# Patient Record
Sex: FEMALE | Race: BLACK OR AFRICAN AMERICAN | NOT HISPANIC OR LATINO | Employment: UNEMPLOYED | RURAL
[De-identification: names, ages, dates, MRNs, and addresses within clinical notes are randomized per-mention and may not be internally consistent; named-entity substitution may affect disease eponyms.]

---

## 2017-05-17 ENCOUNTER — HISTORICAL (OUTPATIENT)
Dept: ADMINISTRATIVE | Facility: HOSPITAL | Age: 57
End: 2017-05-17

## 2017-05-18 LAB
LAB AP CLINICAL INFORMATION: NORMAL
LAB AP DIAGNOSIS - HISTORICAL: NORMAL
LAB AP GROSS PATHOLOGY - HISTORICAL: NORMAL
LAB AP SPECIMEN SUBMITTED - HISTORICAL: NORMAL

## 2018-08-06 ENCOUNTER — HISTORICAL (OUTPATIENT)
Dept: ADMINISTRATIVE | Facility: HOSPITAL | Age: 58
End: 2018-08-06

## 2018-08-08 LAB
LAB AP CLINICAL INFORMATION: NORMAL
LAB AP COMMENTS: NORMAL
LAB AP GENERAL CAT - HISTORICAL: NORMAL
LAB AP INTERPRETATION/RESULT - HISTORICAL: NEGATIVE
LAB AP SPECIMEN ADEQUACY - HISTORICAL: NORMAL
LAB AP SPECIMEN SUBMITTED - HISTORICAL: NORMAL

## 2020-07-27 ENCOUNTER — HISTORICAL (OUTPATIENT)
Dept: ADMINISTRATIVE | Facility: HOSPITAL | Age: 60
End: 2020-07-27

## 2020-07-29 LAB
LAB AP CLINICAL INFORMATION: NORMAL
LAB AP GENERAL CAT - HISTORICAL: NORMAL
LAB AP INTERPRETATION/RESULT - HISTORICAL: NEGATIVE
LAB AP SPECIMEN ADEQUACY - HISTORICAL: NORMAL
LAB AP SPECIMEN SUBMITTED - HISTORICAL: NORMAL

## 2020-07-31 LAB
INSULIN SERPL-ACNC: NORMAL U[IU]/ML

## 2020-12-28 ENCOUNTER — HISTORICAL (OUTPATIENT)
Dept: ADMINISTRATIVE | Facility: HOSPITAL | Age: 60
End: 2020-12-28

## 2020-12-28 LAB — SARS-COV+SARS-COV-2 AG RESP QL IA.RAPID: NEGATIVE

## 2021-03-15 ENCOUNTER — HOSPITAL ENCOUNTER (EMERGENCY)
Facility: HOSPITAL | Age: 61
Discharge: HOME | End: 2021-03-15
Attending: EMERGENCY MEDICINE
Payer: COMMERCIAL

## 2021-03-15 VITALS
DIASTOLIC BLOOD PRESSURE: 103 MMHG | OXYGEN SATURATION: 97 % | WEIGHT: 187 LBS | SYSTOLIC BLOOD PRESSURE: 136 MMHG | RESPIRATION RATE: 20 BRPM | HEIGHT: 65 IN | HEART RATE: 64 BPM | TEMPERATURE: 98 F | BODY MASS INDEX: 31.16 KG/M2

## 2021-03-15 DIAGNOSIS — M79.671 RIGHT FOOT PAIN: ICD-10-CM

## 2021-03-15 DIAGNOSIS — S92.511A CLOSED DISPLACED FRACTURE OF PROXIMAL PHALANX OF LESSER TOE OF RIGHT FOOT, INITIAL ENCOUNTER: ICD-10-CM

## 2021-03-15 PROCEDURE — 29550 STRAPPING OF TOES: CPT | Mod: T9

## 2021-03-15 PROCEDURE — 99283 EMERGENCY DEPT VISIT LOW MDM: CPT | Mod: 25

## 2021-03-15 PROCEDURE — 99283 EMERGENCY DEPT VISIT LOW MDM: CPT | Mod: ,,, | Performed by: NURSE PRACTITIONER

## 2021-03-15 PROCEDURE — 99283 PR EMERGENCY DEPT VISIT,LEVEL III: ICD-10-PCS | Mod: ,,, | Performed by: NURSE PRACTITIONER

## 2021-03-15 RX ORDER — LISINOPRIL 10 MG/1
10 TABLET ORAL DAILY
COMMUNITY
End: 2024-02-07

## 2021-03-15 RX ORDER — HYDROCODONE BITARTRATE AND ACETAMINOPHEN 10; 325 MG/1; MG/1
1 TABLET ORAL EVERY 12 HOURS PRN
COMMUNITY
End: 2024-02-07

## 2021-08-04 RX ORDER — OXYBUTYNIN CHLORIDE 5 MG/1
5 TABLET, EXTENDED RELEASE ORAL DAILY
COMMUNITY
End: 2023-11-07

## 2021-08-04 RX ORDER — POTASSIUM CHLORIDE 750 MG/1
10 CAPSULE, EXTENDED RELEASE ORAL ONCE
COMMUNITY
End: 2023-11-07

## 2021-08-04 RX ORDER — HYDROCHLOROTHIAZIDE 12.5 MG/1
12.5 CAPSULE ORAL DAILY
COMMUNITY
End: 2024-02-07

## 2022-12-29 DIAGNOSIS — M54.2 CERVICALGIA: Primary | ICD-10-CM

## 2023-01-10 ENCOUNTER — CLINICAL SUPPORT (OUTPATIENT)
Dept: REHABILITATION | Facility: HOSPITAL | Age: 63
End: 2023-01-10
Payer: MEDICAID

## 2023-01-10 DIAGNOSIS — M54.42 ACUTE BILATERAL LOW BACK PAIN WITH BILATERAL SCIATICA: Primary | ICD-10-CM

## 2023-01-10 DIAGNOSIS — R29.898 WEAKNESS OF BOTH LEGS: ICD-10-CM

## 2023-01-10 DIAGNOSIS — M25.562 ACUTE PAIN OF BOTH KNEES: ICD-10-CM

## 2023-01-10 DIAGNOSIS — M54.2 CERVICALGIA: ICD-10-CM

## 2023-01-10 DIAGNOSIS — M54.41 ACUTE BILATERAL LOW BACK PAIN WITH BILATERAL SCIATICA: Primary | ICD-10-CM

## 2023-01-10 DIAGNOSIS — R29.898 WEAKNESS OF BACK: ICD-10-CM

## 2023-01-10 DIAGNOSIS — M25.561 ACUTE PAIN OF BOTH KNEES: ICD-10-CM

## 2023-01-10 PROBLEM — M54.40 ACUTE BILATERAL LOW BACK PAIN WITH SCIATICA: Status: ACTIVE | Noted: 2023-01-10

## 2023-01-10 PROCEDURE — 97161 PT EVAL LOW COMPLEX 20 MIN: CPT
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
## 2023-01-18 ENCOUNTER — CLINICAL SUPPORT (OUTPATIENT)
Dept: REHABILITATION | Facility: HOSPITAL | Age: 63
End: 2023-01-18
Payer: MEDICAID

## 2023-01-18 DIAGNOSIS — R29.898 WEAKNESS OF BOTH LEGS: ICD-10-CM

## 2023-01-18 DIAGNOSIS — M25.561 ACUTE PAIN OF BOTH KNEES: ICD-10-CM

## 2023-01-18 DIAGNOSIS — M54.41 ACUTE BILATERAL LOW BACK PAIN WITH RIGHT-SIDED SCIATICA: Primary | ICD-10-CM

## 2023-01-18 DIAGNOSIS — M25.562 ACUTE PAIN OF BOTH KNEES: ICD-10-CM

## 2023-01-18 DIAGNOSIS — R29.898 WEAKNESS OF BACK: ICD-10-CM

## 2023-01-18 PROCEDURE — 97112 NEUROMUSCULAR REEDUCATION: CPT

## 2023-01-18 PROCEDURE — 97110 THERAPEUTIC EXERCISES: CPT

## 2023-01-18 PROCEDURE — 97140 MANUAL THERAPY 1/> REGIONS: CPT
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
## 2023-01-24 ENCOUNTER — CLINICAL SUPPORT (OUTPATIENT)
Dept: REHABILITATION | Facility: HOSPITAL | Age: 63
End: 2023-01-24
Payer: MEDICAID

## 2023-01-24 DIAGNOSIS — R29.898 WEAKNESS OF BOTH LEGS: ICD-10-CM

## 2023-01-24 DIAGNOSIS — R29.898 WEAKNESS OF BACK: ICD-10-CM

## 2023-01-24 DIAGNOSIS — M25.562 ACUTE PAIN OF BOTH KNEES: Primary | ICD-10-CM

## 2023-01-24 DIAGNOSIS — M25.561 ACUTE PAIN OF BOTH KNEES: Primary | ICD-10-CM

## 2023-01-24 PROCEDURE — 97112 NEUROMUSCULAR REEDUCATION: CPT | Mod: CQ

## 2023-01-24 PROCEDURE — 97140 MANUAL THERAPY 1/> REGIONS: CPT | Mod: CQ

## 2023-01-24 PROCEDURE — 97110 THERAPEUTIC EXERCISES: CPT | Mod: CQ
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
## 2023-02-03 ENCOUNTER — DOCUMENTATION ONLY (OUTPATIENT)
Dept: REHABILITATION | Facility: HOSPITAL | Age: 63
End: 2023-02-03
Payer: MEDICAID

## 2023-02-03 NOTE — PROGRESS NOTES
Radha arrived to therapy this morning (2/3/2023) and Therapist greeted her in waiting area to inquire how she was feeling, Radha stated she was not feeling well today and was coughing (which she did a few times while we were conversing) and said she felt like she couldn't do anything due to her not feeling well but she felt like she needed to come in anyway's since she had called and cancelled recently. Radha said she went to the Doctor on Monday and got nasal spray and medicine. Therapist informed Patient if she did not feel like she would be able to tolerate modified treatment session today, that we could reschedule since she was not feeling well, but I did tell her I appreciated her letting us know and I hoped she started feeling better over the weekend.  Notified evaluating therapist of this as well today & will follow up with Patient next week and continue with therapy sessions as tolerated when she returns.

## 2023-02-05 NOTE — PROGRESS NOTES
Subjective:       Patient ID: Radha Diallo is a 62 y.o. female.    Chief Complaint:  No chief complaint on file.      History of Present Illness  This pleasant 62 year old right handed  female presents to the clinic as a new patient referral from Dr. Serafin Ruiz for cervical radiculopathy. Patient reports paresthesia symptoms started 3 months ago and has become progressively worse. She rates the burning, numbness, and tingling as a 5 on a scale of 0 to 5 in all 4 extremities that is worse in the left arm. She states the paresthesia symptoms are worse at night with no difference between activity and rest. She denies diabetes, back injury or neck injury. She states the back pain started 2 years ago in the lumbar region and radiates to the legs. She states the back pain is constant with sharp and dull pain that she rates as a 9 on a scale of 0 to 10. She denies back surgery. She reports neck pain started 3 months ago that radiates to the arms and is worse on the left side. She states the neck pain is constant with sharp and aching pain that she rates as a 9 on a scale of 0 to 10. She denies any neck surgery. She is currently followed by the [facility] for the neck and back pain. She is currently doing physical therapy at [facility] for the back and neck pain. She has not had the EMG NCS or MRI's per the patient. She has gabapentin 100 mg three times a day that she has not been taking and zanaflex 4 mg that she has not been taking. I discussed with her as outlined in the plan. Her PMH includes HTN, vitamin d deficiency, and arthritis. Her family medical history includes DM and arthritis. She denies smoking or drinking alcohol. She states she has been through menopause. Discussed the plan in detail with Neurologist Dr. Katie Ruiz and the patient and they are in agreement with the plan. All her questions were answered at today's visit.       Review of Systems  Review of Systems    Constitutional:  Negative for activity change, diaphoresis, fever and unexpected weight change.   HENT:  Negative for congestion, ear pain, facial swelling, hearing loss, tinnitus, trouble swallowing and voice change.    Eyes:  Negative for photophobia, pain and visual disturbance.   Respiratory:  Negative for chest tightness, shortness of breath and wheezing.    Cardiovascular:  Negative for chest pain, palpitations and leg swelling.   Gastrointestinal:  Negative for constipation, diarrhea, nausea and vomiting.   Genitourinary:  Negative for difficulty urinating.   Musculoskeletal:  Positive for back pain and neck pain. Negative for gait problem and neck stiffness.   Skin:  Negative for color change, pallor, rash and wound.   Neurological:  Positive for numbness. Negative for dizziness, tremors, seizures, syncope, facial asymmetry, speech difficulty, weakness, light-headedness and headaches.        Radiculopathy    Psychiatric/Behavioral:  Negative for agitation, behavioral problems, confusion, decreased concentration and hallucinations. The patient is not nervous/anxious and is not hyperactive.      Objective:      Neurologic Exam     Mental Status   Oriented to person, place, and time.   Oriented to person.   Oriented to place.   Oriented to time.   Attention: normal. Concentration: normal.   Speech: speech is normal   Level of consciousness: alert  Knowledge: good.     Cranial Nerves     CN II   Visual fields full to confrontation.     CN III, IV, VI   Pupils are equal, round, and reactive to light.  Extraocular motions are normal.   Right pupil: Size: 3 mm. Shape: regular. Reactivity: brisk.   Left pupil: Size: 3 mm. Shape: regular. Reactivity: brisk.   CN III: no CN III palsy  CN VI: no CN VI palsy    CN V   Facial sensation intact.     CN VII   Facial expression full, symmetric.     CN VIII   CN VIII normal.   Hearing: intact    CN IX, X   CN IX normal.   CN X normal.     CN XI   CN XI normal.     CN XII    CN XII normal.     Motor Exam   Muscle bulk: normal  Overall muscle tone: normal  Right arm pronator drift: absent  Left arm pronator drift: absent    Strength   Right deltoid: 5/5  Left deltoid: 5/5  Right biceps: 5/5  Left biceps: 5/5  Right triceps: 5/5  Left triceps: 5/5  Right quadriceps: 5/5  Left quadriceps: 5/5  Right hamstrin/5  Left hamstrin/5    Sensory Exam   Light touch normal.   Vibration normal.   Proprioception normal.   Pinprick normal.     Gait, Coordination, and Reflexes     Gait  Gait: normal    Coordination   Romberg: negative  Finger to nose coordination: normal  Heel to shin coordination: normal  Tandem walking coordination: normal    Tremor   Resting tremor: absent  Intention tremor: absent  Action tremor: absent    Reflexes   Right brachioradialis: 2+  Left brachioradialis: 2+  Right biceps: 2+  Left biceps: 2+  Right triceps: 2+  Left triceps: 2+  Right patellar: 2+  Left patellar: 2+  Right achilles: 2+  Left achilles: 2+  Right : 4+  Left : 4+    Physical Exam  Constitutional:       General: She is not in acute distress.  HENT:      Head: Normocephalic.      Nose: Nose normal.      Mouth/Throat:      Mouth: Mucous membranes are moist.   Eyes:      Extraocular Movements: Extraocular movements intact and EOM normal.      Pupils: Pupils are equal, round, and reactive to light.   Cardiovascular:      Rate and Rhythm: Normal rate and regular rhythm.      Heart sounds: Normal heart sounds. No murmur heard.  Pulmonary:      Effort: Pulmonary effort is normal. No respiratory distress.      Breath sounds: Normal breath sounds. No wheezing, rhonchi or rales.   Musculoskeletal:         General: No swelling, tenderness, deformity or signs of injury. Normal range of motion.      Cervical back: Normal range of motion. No rigidity or tenderness.      Right lower leg: No edema.      Left lower leg: No edema.   Skin:     General: Skin is warm and dry.      Capillary Refill: Capillary  refill takes less than 2 seconds.      Coloration: Skin is not jaundiced or pale.      Findings: No bruising, erythema, lesion or rash.   Neurological:      Mental Status: She is alert and oriented to person, place, and time.      Coordination: Finger-Nose-Finger Test, Heel to Shin Test and Romberg Test normal.      Gait: Gait is intact. Tandem walk normal.      Deep Tendon Reflexes:      Reflex Scores:       Tricep reflexes are 2+ on the right side and 2+ on the left side.       Bicep reflexes are 2+ on the right side and 2+ on the left side.       Brachioradialis reflexes are 2+ on the right side and 2+ on the left side.       Patellar reflexes are 2+ on the right side and 2+ on the left side.       Achilles reflexes are 2+ on the right side and 2+ on the left side.  Psychiatric:         Mood and Affect: Mood normal.         Speech: Speech normal.         Behavior: Behavior normal.         Assessment:     Problem List Items Addressed This Visit          Neuro    Cervical radiculopathy    Relevant Orders    Ambulatory referral/consult to Neurology    Protein Electrophoresis, Serum with Reflex VINICIO    Sedimentation Rate    DIAZ EIA w/ Reflex to dsDNA/FER    Syphilis Antibody with reflex to RPR    TSH    T4, Free    Hemoglobin A1C    Lumbar radiculopathy    Relevant Orders    Ambulatory referral/consult to Neurology    Protein Electrophoresis, Serum with Reflex VINICIO    Sedimentation Rate    DIAZ EIA w/ Reflex to dsDNA/FER    Syphilis Antibody with reflex to RPR    TSH    T4, Free    Hemoglobin A1C       Palliative Care    On long term drug therapy    Relevant Orders    Protein Electrophoresis, Serum with Reflex VINICIO    Sedimentation Rate    DIAZ EIA w/ Reflex to dsDNA/FER    Syphilis Antibody with reflex to RPR    TSH    T4, Free    Hemoglobin A1C       Other    Paresthesia - Primary    Relevant Orders    Ambulatory referral/consult to Neurology    Protein Electrophoresis, Serum with Reflex VINICIO    Sedimentation Rate    DIAZ  EIA w/ Reflex to dsDNA/FER    Syphilis Antibody with reflex to RPR    TSH    T4, Free    Hemoglobin A1C         Plan:     Referral for EMG NCS all 4 extremities with Dr. TRACEY Dolan   Labs: PPN panel   Continue Gabapentin 100 mg one in the morning and two at night   Continue Zanaflex 4 mg one at bedtime as needed for neck and back pain or muscle spasms   We will get the MRI of the lumbar spine and cervical spine after EMG NCS and physical therapy are completed  Keep follow up with pain treatment at the [facility] for back and neck pain  Continue physical therapy   Further recommendations after work up is completed   Follow up with Neurology in 3 months or sooner if needed

## 2023-02-09 ENCOUNTER — OFFICE VISIT (OUTPATIENT)
Dept: NEUROLOGY | Facility: CLINIC | Age: 63
End: 2023-02-09
Payer: MEDICAID

## 2023-02-09 VITALS
SYSTOLIC BLOOD PRESSURE: 120 MMHG | BODY MASS INDEX: 29.16 KG/M2 | HEART RATE: 71 BPM | WEIGHT: 175 LBS | OXYGEN SATURATION: 96 % | HEIGHT: 65 IN | DIASTOLIC BLOOD PRESSURE: 76 MMHG

## 2023-02-09 DIAGNOSIS — M54.12 CERVICAL RADICULOPATHY: ICD-10-CM

## 2023-02-09 DIAGNOSIS — Z79.899 ON LONG TERM DRUG THERAPY: ICD-10-CM

## 2023-02-09 DIAGNOSIS — R20.2 PARESTHESIA: Primary | ICD-10-CM

## 2023-02-09 DIAGNOSIS — M54.16 LUMBAR RADICULOPATHY: ICD-10-CM

## 2023-02-09 PROBLEM — I10 ESSENTIAL HYPERTENSION: Status: ACTIVE | Noted: 2021-08-31

## 2023-02-09 PROBLEM — E55.9 VITAMIN D DEFICIENCY: Status: ACTIVE | Noted: 2021-09-01

## 2023-02-09 PROBLEM — E78.01 FAMILIAL HYPERCHOLESTEROLEMIA: Status: ACTIVE | Noted: 2021-09-01

## 2023-02-09 PROCEDURE — 99203 OFFICE O/P NEW LOW 30 MIN: CPT | Mod: S$PBB,,, | Performed by: NURSE PRACTITIONER

## 2023-02-09 PROCEDURE — 99215 OFFICE O/P EST HI 40 MIN: CPT | Mod: PBBFAC | Performed by: NURSE PRACTITIONER

## 2023-02-09 PROCEDURE — 3008F BODY MASS INDEX DOCD: CPT | Mod: CPTII,,, | Performed by: NURSE PRACTITIONER

## 2023-02-09 PROCEDURE — 3074F SYST BP LT 130 MM HG: CPT | Mod: CPTII,,, | Performed by: NURSE PRACTITIONER

## 2023-02-09 PROCEDURE — 4010F ACE/ARB THERAPY RXD/TAKEN: CPT | Mod: CPTII,,, | Performed by: NURSE PRACTITIONER

## 2023-02-09 PROCEDURE — 1160F PR REVIEW ALL MEDS BY PRESCRIBER/CLIN PHARMACIST DOCUMENTED: ICD-10-PCS | Mod: CPTII,,, | Performed by: NURSE PRACTITIONER

## 2023-02-09 PROCEDURE — 3078F PR MOST RECENT DIASTOLIC BLOOD PRESSURE < 80 MM HG: ICD-10-PCS | Mod: CPTII,,, | Performed by: NURSE PRACTITIONER

## 2023-02-09 PROCEDURE — 1159F MED LIST DOCD IN RCRD: CPT | Mod: CPTII,,, | Performed by: NURSE PRACTITIONER

## 2023-02-09 PROCEDURE — 3074F PR MOST RECENT SYSTOLIC BLOOD PRESSURE < 130 MM HG: ICD-10-PCS | Mod: CPTII,,, | Performed by: NURSE PRACTITIONER

## 2023-02-09 PROCEDURE — 99203 PR OFFICE/OUTPT VISIT, NEW, LEVL III, 30-44 MIN: ICD-10-PCS | Mod: S$PBB,,, | Performed by: NURSE PRACTITIONER

## 2023-02-09 PROCEDURE — 3078F DIAST BP <80 MM HG: CPT | Mod: CPTII,,, | Performed by: NURSE PRACTITIONER

## 2023-02-09 PROCEDURE — 3008F PR BODY MASS INDEX (BMI) DOCUMENTED: ICD-10-PCS | Mod: CPTII,,, | Performed by: NURSE PRACTITIONER

## 2023-02-09 PROCEDURE — 4010F PR ACE/ARB THEARPY RXD/TAKEN: ICD-10-PCS | Mod: CPTII,,, | Performed by: NURSE PRACTITIONER

## 2023-02-09 PROCEDURE — 1160F RVW MEDS BY RX/DR IN RCRD: CPT | Mod: CPTII,,, | Performed by: NURSE PRACTITIONER

## 2023-02-09 PROCEDURE — 1159F PR MEDICATION LIST DOCUMENTED IN MEDICAL RECORD: ICD-10-PCS | Mod: CPTII,,, | Performed by: NURSE PRACTITIONER

## 2023-02-09 RX ORDER — ROSUVASTATIN CALCIUM 10 MG/1
20 TABLET, COATED ORAL NIGHTLY
COMMUNITY

## 2023-02-09 RX ORDER — METHYLPREDNISOLONE 4 MG/1
TABLET ORAL
COMMUNITY
Start: 2022-11-25 | End: 2023-11-07

## 2023-02-09 RX ORDER — FLUTICASONE PROPIONATE 50 MCG
SPRAY, SUSPENSION (ML) NASAL
COMMUNITY
Start: 2023-01-28 | End: 2023-11-07

## 2023-02-09 RX ORDER — LISINOPRIL AND HYDROCHLOROTHIAZIDE 10; 12.5 MG/1; MG/1
TABLET ORAL
COMMUNITY
End: 2024-03-11 | Stop reason: DRUGHIGH

## 2023-02-09 RX ORDER — LORATADINE 10 MG/1
10 TABLET ORAL DAILY PRN
COMMUNITY
Start: 2023-01-28 | End: 2023-11-07

## 2023-02-09 RX ORDER — TIZANIDINE 4 MG/1
TABLET ORAL
COMMUNITY
End: 2023-11-07

## 2023-02-09 RX ORDER — GABAPENTIN 100 MG/1
CAPSULE ORAL
COMMUNITY
End: 2023-06-14

## 2023-02-09 RX ORDER — FLUTICASONE PROPIONATE 50 MCG
SPRAY, SUSPENSION (ML) NASAL
COMMUNITY
End: 2023-11-07

## 2023-02-09 RX ORDER — CALCIUM CARBONATE/VITAMIN D3 600 MG-20
TABLET,CHEWABLE ORAL
COMMUNITY

## 2023-02-09 RX ORDER — CYCLOBENZAPRINE HCL 10 MG
TABLET ORAL
COMMUNITY
End: 2024-02-07

## 2023-02-09 RX ORDER — PROMETHAZINE HYDROCHLORIDE AND DEXTROMETHORPHAN HYDROBROMIDE 6.25; 15 MG/5ML; MG/5ML
SYRUP ORAL
COMMUNITY
End: 2023-11-07

## 2023-02-09 NOTE — PATIENT INSTRUCTIONS
Referral for EMG NCS all 4 extremities with Dr. TRACEY Dolan   Labs: PPN panel   Continue Gabapentin 100 mg one in the morning and two at night   Continue Zanaflex 4 mg one at bedtime as needed for neck and back pain or muscle spasms   We will get the MRI of the lumbar spine and cervical spine after EMG NCS and physical therapy are completed  Keep follow up with pain treatment at the [facility] for back and neck pain  Continue physical therapy   Further recommendations after work up is completed   Follow up with Neurology in 3 months or sooner if needed

## 2023-02-10 ENCOUNTER — CLINICAL SUPPORT (OUTPATIENT)
Dept: REHABILITATION | Facility: HOSPITAL | Age: 63
End: 2023-02-10
Payer: MEDICAID

## 2023-02-10 DIAGNOSIS — M25.561 ACUTE PAIN OF BOTH KNEES: Primary | ICD-10-CM

## 2023-02-10 DIAGNOSIS — R29.898 WEAKNESS OF BACK: ICD-10-CM

## 2023-02-10 DIAGNOSIS — M25.562 ACUTE PAIN OF BOTH KNEES: Primary | ICD-10-CM

## 2023-02-10 DIAGNOSIS — R29.898 WEAKNESS OF BOTH LEGS: ICD-10-CM

## 2023-02-10 PROCEDURE — 97112 NEUROMUSCULAR REEDUCATION: CPT | Mod: CQ

## 2023-02-10 PROCEDURE — 97110 THERAPEUTIC EXERCISES: CPT | Mod: CQ
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
## 2023-02-28 ENCOUNTER — CLINICAL SUPPORT (OUTPATIENT)
Dept: REHABILITATION | Facility: HOSPITAL | Age: 63
End: 2023-02-28
Payer: MEDICAID

## 2023-02-28 DIAGNOSIS — M54.41 ACUTE BILATERAL LOW BACK PAIN WITH RIGHT-SIDED SCIATICA: Primary | ICD-10-CM

## 2023-02-28 DIAGNOSIS — M25.561 ACUTE PAIN OF BOTH KNEES: ICD-10-CM

## 2023-02-28 DIAGNOSIS — M25.562 ACUTE PAIN OF BOTH KNEES: ICD-10-CM

## 2023-02-28 DIAGNOSIS — R29.898 WEAKNESS OF BACK: ICD-10-CM

## 2023-02-28 DIAGNOSIS — R29.898 WEAKNESS OF BOTH LEGS: ICD-10-CM

## 2023-02-28 PROBLEM — M54.40 ACUTE BILATERAL LOW BACK PAIN WITH SCIATICA: Status: RESOLVED | Noted: 2023-01-10 | Resolved: 2023-02-28

## 2023-02-28 PROCEDURE — 97112 NEUROMUSCULAR REEDUCATION: CPT

## 2023-02-28 PROCEDURE — 97110 THERAPEUTIC EXERCISES: CPT

## 2023-02-28 NOTE — PLAN OF CARE
[facility]   Physical Therapy Discharge Summary      Name: Radha Diallo  Clinic Number: [id number]    Therapy Diagnosis: Low Back Pain   Physician: Jalen Jeffers MD    Visit Date: 2/28/2023    Physician Orders: PT Eval and Treat   Medical Diagnosis from Referral: Low Back Pain   Evaluation Date: 1/10/2023  Authorization Period Expiration: 3/10/2023  Plan of Care Expiration: 3/10/2023  Visit # / Visits authorized: 5/ 17   PTA Visit # 2/5    Based on the information submitted, the followingservices have been approved:  Procedure Code Effective Date Expiration Date Requested Units Approved Units  59065 01/12/2023 03/10/2023 32 32  [id number] 01/12/2023 03/10/2023 16 16  [id number] 01/12/2023 03/10/2023 16 16  [id number] 01/12/2023 03/10/2023 16 16  [id number] 01/12/2023 03/10/2023 16 16  The treatmentauthorization number (TAN) for thisapproval is: [id number]      Time In: 3:58 pm  Time Out: 4:45 pm  Total Billable Time: 45 minutes    Precautions: Standard  Functional Level at time of Function: Pain prevents her from cleaning her house and walking for prolonged periods of time.      Subjective     Pt reports: she has a lot going on right now and requests to be discharged from Therapy today   She was compliant with home exercise program.    Pain: 4/10  Location: lower back    Objective     Radha received therapeutic exercises  to develop strength, endurance, ROM, & flexibility for 30 minutes including:     Reviewed her Home Exercise Program today and     Back Exercises  Bike 5 Min    Calf Stretch 4 x 15 sec at the slantboard    Hamstring Stretch 3 x 15 sec bilateral at step   Swiss ball rollouts seated 5x 2-3 second hold forward/left/right rotation    Seated piriformis stretch  2 x 15 second hold bilateral    Cybex back extension  2 plates 2 x 10    Cybex hamstring curls  4 plates 3 x 10   Cybex bilateral leg press 6 plates 2 x 10   Cybex bilateral hip abduction  1 plate 2 x 10   Ball - Trunk  Rotation     Ball- Knee Extension                        neuromuscular re-education activities to improve: Sense, Proprioception, core stabilization, and Posture for 15 minutes. The following activities were included:    Bilateral shoulder extension with scapular retraction red theraband 2 x 10 2 second hold   Palloff press 2 x 10 bilateral blue theraband handles   Cybex rows/retraction 2 plates 2 x 10 hi/lo   DLS on wobble board 3 x 20 second hold   Unilateral SLS on foam block 4 x 15 second hold each lower extremity     Radha received the following manual therapy techniques: Joint mobilizations, Myofacial release, and Soft tissue Mobilization were applied to the: Low Back and Lower Extremities for 0 minutes, including:    SKTC   DKTC   Piriformis   Hamstring stretch   Knee Flexion stretch     Home Exercises Provided and Patient Education Provided     Education provided: home exercise program review     Written Home Exercises Provided: Patient instructed to cont prior HEP.  Exercises were reviewed and Radha was able to demonstrate them prior to the end of the session.  Radha demonstrated good  understanding of the education provided.     See EMR under Patient Instructions for exercises provided 1/12/2023.    Assessment     Patient presented to Therapy today with reports of less pain in her back and Lower Extremities. She reports she has been doing her exercises at home. She missed Therapy last week due to being out of town. Patient spoke with Therapist about some personal issues that are going on right now in dealing with the death of her grandson. She requests to be discharged from Therapy at this time due to having to deal with these issues. Therapist agreed. We reviewed her Home Exercise Program and patient will continue to exercise at home. She was able to meet her Short Term Goals but not her Long Term Goals. Discussed with patient that she can return to Therapy in the future if she wants to continue to  work on her Long Term Goals.            Anticipated Barriers for therapy: Chronic Pain         Goals:  Short Term Goals: 4 weeks   1. Patient will be Independent with Home Exercise Program - Goal Met   2. Patient will demonstrate with improved Posture and Body Mechanics  - Goal Met   3. Patient will increase Lumbosacral Range of Motion by 25% and Knee Range of Motion to 0 to 120 degrees  - Goal Met   4. Patient will increase Lower Extremity and Core Strength to grossly 4/5 - Goal Met   5. Patient will decrease complaints of pain with activity to Less than or Equal to 5/10  - Goal Met      Long Term Goals: 8 weeks   1. Patient will tolerate 30 minutes of activity with complaints of Pain at Less Than or Equal to 4/10   - Goal Not Met   2. Patient will increase Lower Extremity and Core Strength to grossly 4+/5 - Goal Not Met       Discharge reason: Patient requested discharge    Plan   This patient is discharged from Physical Therapy.    Date of Last visit: 2/28/2023  Total Visits Received: 5  Cancelled Visits: 0  No Show Visits: 0    ARNOLDO H JEFF, PT, DPT

## 2023-02-28 NOTE — PROGRESS NOTES
[facility]   Physical Therapy Treatment Note     Name: Radha Diallo  Clinic Number: [id number]    Therapy Diagnosis: Low Back Pain   Physician: Jalen Jeffers MD    Visit Date: 2/28/2023    Physician Orders: PT Eval and Treat   Medical Diagnosis from Referral: Low Back Pain   Evaluation Date: 1/10/2023  Authorization Period Expiration: 3/10/2023  Plan of Care Expiration: 3/10/2023  Visit # / Visits authorized: 5/ 17   PTA Visit # 2/5    Based on the information submitted, the followingservices have been approved:  Procedure Code Effective Date Expiration Date Requested Units Approved Units  45488 01/12/2023 03/10/2023 32 32  [id number] 01/12/2023 03/10/2023 16 16  [id number] 01/12/2023 03/10/2023 16 16  [id number] 01/12/2023 03/10/2023 16 16  [id number] 01/12/2023 03/10/2023 16 16  The treatmentauthorization number (TAN) for thisapproval is: [id number]      Time In: 3:58 pm  Time Out: 4:45 pm  Total Billable Time: 45 minutes    Precautions: Standard  Functional Level at time of Function: Pain prevents her from cleaning her house and walking for prolonged periods of time.      Subjective     Pt reports: she has a lot going on right now and requests to be discharged from Therapy today   She was compliant with home exercise program.    Pain: 4/10  Location: lower back    Objective     Radha received therapeutic exercises  to develop strength, endurance, ROM, & flexibility for 30 minutes including:     Reviewed her Home Exercise Program today and     Back Exercises  Bike 5 Min    Calf Stretch 4 x 15 sec at the slantboard    Hamstring Stretch 3 x 15 sec bilateral at step   Swiss ball rollouts seated 5x 2-3 second hold forward/left/right rotation    Seated piriformis stretch  2 x 15 second hold bilateral    Cybex back extension  2 plates 2 x 10    Cybex hamstring curls  4 plates 3 x 10   Cybex bilateral leg press 6 plates 2 x 10   Cybex bilateral hip abduction  1 plate 2 x 10   Ball - Trunk  Rotation     Ball- Knee Extension                        neuromuscular re-education activities to improve: Sense, Proprioception, core stabilization, and Posture for 15 minutes. The following activities were included:    Bilateral shoulder extension with scapular retraction red theraband 2 x 10 2 second hold   Palloff press 2 x 10 bilateral blue theraband handles   Cybex rows/retraction 2 plates 2 x 10 hi/lo   DLS on wobble board 3 x 20 second hold   Unilateral SLS on foam block 4 x 15 second hold each lower extremity     Radha received the following manual therapy techniques: Joint mobilizations, Myofacial release, and Soft tissue Mobilization were applied to the: Low Back and Lower Extremities for 0 minutes, including:    SKTC   DKTC   Piriformis   Hamstring stretch   Knee Flexion stretch     Home Exercises Provided and Patient Education Provided     Education provided: home exercise program review     Written Home Exercises Provided: Patient instructed to cont prior HEP.  Exercises were reviewed and Radha was able to demonstrate them prior to the end of the session.  Radha demonstrated good  understanding of the education provided.     See EMR under Patient Instructions for exercises provided 1/12/2023.    Assessment     Patient presented to Therapy today with reports of less pain in her back and Lower Extremities. She reports she has been doing her exercises at home. She missed Therapy last week due to being out of town. Patient spoke with Therapist about some personal issues that are going on right now in dealing with the death of her grandson. She requests to be discharged from Therapy at this time due to having to deal with these issues. Therapist agreed. We reviewed her Home Exercise Program and patient will continue to exercise at home. She was able to meet her Short Term Goals but not her Long Term Goals. Discussed with patient that she can return to Therapy in the future if she wants to continue to  work on her Long Term Goals.            Anticipated Barriers for therapy: Chronic Pain         Goals:  Short Term Goals: 4 weeks   1. Patient will be Independent with Home Exercise Program - Goal Met   2. Patient will demonstrate with improved Posture and Body Mechanics  - Goal Met   3. Patient will increase Lumbosacral Range of Motion by 25% and Knee Range of Motion to 0 to 120 degrees  - Goal Met   4. Patient will increase Lower Extremity and Core Strength to grossly 4/5 - Goal Met   5. Patient will decrease complaints of pain with activity to Less than or Equal to 5/10  - Goal Met      Long Term Goals: 8 weeks   1. Patient will tolerate 30 minutes of activity with complaints of Pain at Less Than or Equal to 4/10   - Goal Not Met   2. Patient will increase Lower Extremity and Core Strength to grossly 4+/5 - Goal Not Met         Plan     Patient to be Discharged from Physical Therapy services following today's treatment. See Discharge Summary if needed.      ARNOLDO AGUILAR, PT, DPT   2/28/2023

## 2023-06-14 ENCOUNTER — OFFICE VISIT (OUTPATIENT)
Dept: NEUROLOGY | Facility: CLINIC | Age: 63
End: 2023-06-14
Payer: MEDICARE

## 2023-06-14 VITALS
BODY MASS INDEX: 29.99 KG/M2 | OXYGEN SATURATION: 97 % | HEART RATE: 71 BPM | HEIGHT: 65 IN | DIASTOLIC BLOOD PRESSURE: 82 MMHG | WEIGHT: 180 LBS | SYSTOLIC BLOOD PRESSURE: 136 MMHG

## 2023-06-14 DIAGNOSIS — E53.8 VITAMIN B12 DEFICIENCY: ICD-10-CM

## 2023-06-14 DIAGNOSIS — R20.2 PARESTHESIA: Primary | ICD-10-CM

## 2023-06-14 DIAGNOSIS — M54.16 LUMBAR RADICULOPATHY: ICD-10-CM

## 2023-06-14 DIAGNOSIS — M54.12 CERVICAL RADICULOPATHY: ICD-10-CM

## 2023-06-14 DIAGNOSIS — E03.9 HYPOTHYROIDISM, UNSPECIFIED TYPE: ICD-10-CM

## 2023-06-14 PROCEDURE — 99213 PR OFFICE/OUTPT VISIT, EST, LEVL III, 20-29 MIN: ICD-10-PCS | Mod: S$PBB,,, | Performed by: NURSE PRACTITIONER

## 2023-06-14 PROCEDURE — 4010F ACE/ARB THERAPY RXD/TAKEN: CPT | Mod: CPTII,,, | Performed by: NURSE PRACTITIONER

## 2023-06-14 PROCEDURE — 99213 OFFICE O/P EST LOW 20 MIN: CPT | Mod: S$PBB,,, | Performed by: NURSE PRACTITIONER

## 2023-06-14 PROCEDURE — 3075F SYST BP GE 130 - 139MM HG: CPT | Mod: CPTII,,, | Performed by: NURSE PRACTITIONER

## 2023-06-14 PROCEDURE — 3079F DIAST BP 80-89 MM HG: CPT | Mod: CPTII,,, | Performed by: NURSE PRACTITIONER

## 2023-06-14 PROCEDURE — 1159F MED LIST DOCD IN RCRD: CPT | Mod: CPTII,,, | Performed by: NURSE PRACTITIONER

## 2023-06-14 PROCEDURE — 1160F PR REVIEW ALL MEDS BY PRESCRIBER/CLIN PHARMACIST DOCUMENTED: ICD-10-PCS | Mod: CPTII,,, | Performed by: NURSE PRACTITIONER

## 2023-06-14 PROCEDURE — 3079F PR MOST RECENT DIASTOLIC BLOOD PRESSURE 80-89 MM HG: ICD-10-PCS | Mod: CPTII,,, | Performed by: NURSE PRACTITIONER

## 2023-06-14 PROCEDURE — 3075F PR MOST RECENT SYSTOLIC BLOOD PRESS GE 130-139MM HG: ICD-10-PCS | Mod: CPTII,,, | Performed by: NURSE PRACTITIONER

## 2023-06-14 PROCEDURE — 4010F PR ACE/ARB THEARPY RXD/TAKEN: ICD-10-PCS | Mod: CPTII,,, | Performed by: NURSE PRACTITIONER

## 2023-06-14 PROCEDURE — 3008F PR BODY MASS INDEX (BMI) DOCUMENTED: ICD-10-PCS | Mod: CPTII,,, | Performed by: NURSE PRACTITIONER

## 2023-06-14 PROCEDURE — 3008F BODY MASS INDEX DOCD: CPT | Mod: CPTII,,, | Performed by: NURSE PRACTITIONER

## 2023-06-14 PROCEDURE — 1159F PR MEDICATION LIST DOCUMENTED IN MEDICAL RECORD: ICD-10-PCS | Mod: CPTII,,, | Performed by: NURSE PRACTITIONER

## 2023-06-14 PROCEDURE — 99215 OFFICE O/P EST HI 40 MIN: CPT | Mod: PBBFAC | Performed by: NURSE PRACTITIONER

## 2023-06-14 PROCEDURE — 1160F RVW MEDS BY RX/DR IN RCRD: CPT | Mod: CPTII,,, | Performed by: NURSE PRACTITIONER

## 2023-06-14 RX ORDER — ERGOCALCIFEROL 1.25 MG/1
50000 CAPSULE ORAL
COMMUNITY
Start: 2023-05-31 | End: 2024-02-07

## 2023-06-14 NOTE — PROGRESS NOTES
Subjective:       Patient ID: Radha Diallo is a 62 y.o. female     Chief Complaint:    Chief Complaint   Patient presents with    Follow-up        Allergies:  Patient has no known allergies.    Current Medications:    Outpatient Encounter Medications as of 6/14/2023   Medication Sig Dispense Refill    calcium carbonate-vitamin D3 (CALTRATE 600 PLUS D) 600 mg-20 mcg (800 unit) Chew Caltrate 600 plus D  600 mg-20 mcg (800 unit) chewable tablet   2 tablets po qd      cyclobenzaprine (FLEXERIL) 10 MG tablet cyclobenzaprine 10 mg tablet   TAKE 1 TABLET BY MOUTH THREE TIMES DAILY      ergocalciferol (ERGOCALCIFEROL) 50,000 unit Cap Take 50,000 Units by mouth every 7 days.      fluticasone propionate (FLONASE) 50 mcg/actuation nasal spray SHAKE LIQUID AND USE 1 SPRAY IN EACH NOSTRIL EVERY DAY AS DIRECTED      fluticasone propionate (FLONASE) 50 mcg/actuation nasal spray fluticasone propionate 50 mcg/actuation nasal spray,suspension   SHAKE LIQUID AND USE 1 SPRAY IN EACH NOSTRIL EVERY DAY AS DIRECTED      hydroCHLOROthiazide (MICROZIDE) 12.5 mg capsule Take 12.5 mg by mouth once daily.      HYDROcodone-acetaminophen (NORCO)  mg per tablet Take 1 tablet by mouth every 12 (twelve) hours as needed for Pain.      lisinopriL 10 MG tablet Take 10 mg by mouth once daily.      lisinopriL-hydrochlorothiazide (PRINZIDE,ZESTORETIC) 10-12.5 mg per tablet lisinopril 10 mg-hydrochlorothiazide 12.5 mg tablet   TAKE ONE TABLET BY MOUTH ONE TIME DAILY      loratadine (CLARITIN) 10 mg tablet Take 10 mg by mouth daily as needed.      methylPREDNISolone (MEDROL DOSEPACK) 4 mg tablet TAKE AS DIRECTED ON PACKAGE, TAKE WITH FOOD      oxybutynin (DITROPAN-XL) 5 MG TR24 Take 5 mg by mouth once daily.      potassium chloride (MICRO-K) 10 MEQ CpSR Take 10 mEq by mouth once.      promethazine-dextromethorphan (PROMETHAZINE-DM) 6.25-15 mg/5 mL Syrp promethazine-DM 6.25 mg-15 mg/5 mL oral syrup   TAKE 5 ML BY MOUTH EVERY 4 HOURS FOR 15  DAYS AS NEEDED      rosuvastatin (CRESTOR) 10 MG tablet rosuvastatin 10 mg tablet   TAKE ONE TABLET BY MOUTH AT BEDTIME      tiZANidine (ZANAFLEX) 4 MG tablet tizanidine 4 mg tablet   take ONE tablet by MOUTH ONCE daily as needed FOR muscle SPASMS      [DISCONTINUED] gabapentin (NEURONTIN) 100 MG capsule gabapentin 100 mg capsule   TAKE ONE CAPSULE BY MOUTH THREE TIMES DAILY AS NEEDED FOR 30 DAYS       No facility-administered encounter medications on file as of 6/14/2023.       History of Present Illness  61 y/o female following in neurology for reported radiculopathy.    Prior followed by DUY Knott    She is followed in pain treatment in [place], but she lives in [place].  Apparently her UDS did not reflect she was taking her meds correctly with Dr. Miller.    She presented 4 months ago with reported numbness and burning in all 4 extremities.  Ongoing at that time about 3 months.  No prior diabetes, back or neck injury though she has chronic neck and back pain, thus followed in pain treatment.  Reported symptoms of radiculopathy in the BLE and BUE.  She was already doing PT per the records for neck and back pain.  Prescribed neurontin 100mg tid but was not taking.  She reports is taking something else, but did not bring her meds so we aren't sure what this is.    She was referred to Dr. Dolan for EMG studies, but no reports.  She had extensive labs ordered but they were not obtained by the patient.  She reports she forgot both of them.           Review of Systems  Review of Systems   Constitutional:  Negative for diaphoresis and fever.   HENT:  Negative for congestion, hearing loss and tinnitus.    Eyes:  Negative for blurred vision, double vision, photophobia, discharge and redness.   Respiratory:  Negative for cough and shortness of breath.    Cardiovascular:  Negative for chest pain.   Gastrointestinal:  Negative for abdominal pain, nausea and vomiting.   Musculoskeletal:  Positive for back pain and neck  pain. Negative for joint pain and myalgias.   Skin:  Negative for itching and rash.   Neurological:  Positive for tingling and sensory change. Negative for dizziness, tremors, speech change, focal weakness, seizures, loss of consciousness, weakness and headaches.   Psychiatric/Behavioral:  Negative for depression, hallucinations and memory loss. The patient does not have insomnia.    All other systems reviewed and are negative.   Objective:     NEUROLOGICAL EXAMINATION:     MENTAL STATUS   Oriented to person, place, and time.   Registration: recalls 3 of 3 objects. Recall at 5 minutes: recalls 3 of 3 objects.   Attention: normal. Concentration: normal.   Speech: speech is normal   Level of consciousness: alert  Knowledge: good and consistent with education.   Normal comprehension.     CRANIAL NERVES     CN II   Visual fields full to confrontation.   Visual acuity: normal  Right visual field deficit: none  Left visual field deficit: none     CN III, IV, VI   Pupils are equal, round, and reactive to light.  Extraocular motions are normal.   Right pupil: Size: 3 mm. Shape: regular. Reactivity: brisk. Consensual response: intact. Accommodation: intact.   Left pupil: Size: 3 mm. Shape: regular. Reactivity: brisk. Consensual response: intact. Accommodation: intact.   CN III: no CN III palsy  CN VI: no CN VI palsy  Nystagmus: none   Diplopia: none  Upgaze: normal  Downgaze: normal  Conjugate gaze: present  Vestibulo-ocular reflex: present    CN V   Facial sensation intact.   Right facial sensation deficit: none  Left facial sensation deficit: none  Right corneal reflex: normal  Left corneal reflex: normal    CN VII   Facial expression full, symmetric.   Right facial weakness: none  Left facial weakness: none  Right taste: normal  Left taste: normal    CN VIII   CN VIII normal.   Hearing: intact    CN IX, X   CN IX normal.   CN X normal.   Palate: symmetric    CN XI   CN XI normal.   Right sternocleidomastoid strength:  normal  Left sternocleidomastoid strength: normal  Right trapezius strength: normal  Left trapezius strength: normal    CN XII   CN XII normal.   Tongue: not atrophic  Fasciculations: absent  Tongue deviation: none    MOTOR EXAM   Muscle bulk: normal  Overall muscle tone: normal  Right arm tone: normal  Left arm tone: normal  Right arm pronator drift: absent  Left arm pronator drift: absent  Right leg tone: normal  Left leg tone: normal    Strength   Right neck flexion: 5/5  Left neck flexion: 5/5  Right neck extension: 5/5  Left neck extension: 5/5  Right deltoid: 5/5  Left deltoid: 5/5  Right biceps: 5/5  Left biceps: 5/5  Right triceps: 5/5  Left triceps: 5/5  Right wrist flexion: 5/5  Left wrist flexion: 5/5  Right wrist extension: 5/5  Left wrist extension: 5/5  Right interossei: 5/5  Left interossei: 5/5  Right iliopsoas: 5/5  Left iliopsoas: 5/5  Right quadriceps: 5/5  Left quadriceps: 5/5  Right hamstrin/5  Left hamstrin/5  Right anterior tibial: 5/5  Left anterior tibial: 5/5  Right posterior tibial: 5/5  Left posterior tibial: 5/5  Right gastroc: 5/5  Left gastroc: 5/5    REFLEXES     Reflexes   Right brachioradialis: 2+  Left brachioradialis: 2+  Right biceps: 2+  Left biceps: 2+  Right triceps: 2+  Left triceps: 2+  Right patellar: 2+  Left patellar: 2+  Right achilles: 2+  Left achilles: 2+  Right plantar: normal  Left plantar: normal  Right Montejo: absent  Left Montejo: absent  Right ankle clonus: absent  Left ankle clonus: absent  Right pendular knee jerk: absent  Left pendular knee jerk: absent    SENSORY EXAM   Light touch normal.   Right arm light touch: normal  Left arm light touch: normal  Right leg light touch: normal  Left leg light touch: normal  Vibration normal.   Right arm vibration: normal  Left arm vibration: normal  Right leg vibration: normal  Left leg vibration: normal  Proprioception normal.   Right arm proprioception: normal  Left arm proprioception: normal  Right leg  proprioception: normal  Left leg proprioception: normal  Pinprick normal.   Right arm pinprick: normal  Left arm pinprick: normal  Right leg pinprick: normal  Left leg pinprick: normal  Graphesthesia: normal  Romberg: negative  Stereognosis: normal    GAIT AND COORDINATION     Gait  Gait: normal     Coordination   Finger to nose coordination: normal  Heel to shin coordination: normal  Tandem walking coordination: normal    Tremor   Resting tremor: absent  Intention tremor: absent  Action tremor: absent     Physical Exam  Vitals and nursing note reviewed.   Constitutional:       Appearance: Normal appearance.   HENT:      Head: Normocephalic.   Eyes:      Extraocular Movements: Extraocular movements intact and EOM normal.      Pupils: Pupils are equal, round, and reactive to light.   Cardiovascular:      Rate and Rhythm: Normal rate and regular rhythm.   Pulmonary:      Effort: Pulmonary effort is normal.      Breath sounds: Normal breath sounds.   Musculoskeletal:         General: No swelling or tenderness. Normal range of motion.      Cervical back: Normal range of motion and neck supple.      Right lower leg: No edema.      Left lower leg: No edema.   Skin:     General: Skin is warm and dry.      Coloration: Skin is not jaundiced.      Findings: No rash.   Neurological:      General: No focal deficit present.      Mental Status: She is alert and oriented to person, place, and time.      GCS: GCS eye subscore is 4. GCS verbal subscore is 5. GCS motor subscore is 6.      Cranial Nerves: No cranial nerve deficit.      Sensory: No sensory deficit.      Motor: Motor function is intact. No weakness.      Coordination: Coordination is intact. Coordination normal. Finger-Nose-Finger Test, Heel to Shin Test and Romberg Test normal.      Gait: Gait is intact. Gait and tandem walk normal.      Deep Tendon Reflexes: Reflexes normal.      Reflex Scores:       Tricep reflexes are 2+ on the right side and 2+ on the left side.        Bicep reflexes are 2+ on the right side and 2+ on the left side.       Brachioradialis reflexes are 2+ on the right side and 2+ on the left side.       Patellar reflexes are 2+ on the right side and 2+ on the left side.       Achilles reflexes are 2+ on the right side and 2+ on the left side.  Psychiatric:         Mood and Affect: Mood normal.         Speech: Speech normal.         Behavior: Behavior normal.        Assessment:     Problem List Items Addressed This Visit          Neuro    Cervical radiculopathy    Lumbar radiculopathy       Other    Paresthesia - Primary    Relevant Orders    CBC Auto Differential    Comprehensive Metabolic Panel    Sedimentation Rate    DIAZ EIA w/ Reflex to dsDNA/FER    Syphilis Antibody with reflex to RPR     Other Visit Diagnoses       Vitamin B12 deficiency        Relevant Orders    Folate    Vitamin B12    Hypothyroidism, unspecified type        Relevant Orders    TSH             Primary Diagnosis and ICD10  Paresthesia [R20.2]    Plan:     Patient Instructions   Still need to get PPN panel labs  Need patient to bring pill bottles to clinic so we can see what is being taken  Not taking neurontin at this time  [facility] from [facility], Dr. Mcfarland  May schedule EMG/NCS pending labs    Medications Discontinued During This Encounter   Medication Reason    gabapentin (NEURONTIN) 100 MG capsule        Requested Prescriptions      No prescriptions requested or ordered in this encounter       Orders Placed This Encounter   Procedures    CBC Auto Differential    Comprehensive Metabolic Panel    Folate    Vitamin B12    TSH    Sedimentation Rate    DIAZ EIA w/ Reflex to dsDNA/FER    Syphilis Antibody with reflex to RPR

## 2023-06-15 ENCOUNTER — TELEPHONE (OUTPATIENT)
Dept: NEUROLOGY | Facility: CLINIC | Age: 63
End: 2023-06-15
Payer: MEDICARE

## 2023-06-15 DIAGNOSIS — R20.2 PARESTHESIA: Primary | ICD-10-CM

## 2023-06-15 NOTE — TELEPHONE ENCOUNTER
Called pt and gave her the information below from JENNIFER Forde NP. She states has never been treated before and will come have lab done.             ----- Message from DUY Parks sent at 6/15/2023  3:22 PM CDT -----  One of her tests for syhilis was reactive, the RPR was negative.  In this case if she has had syphilis in the past and treated then this is likely okay, if not then I need to get an additional lab to further look into this.  I already placed order for the lab.  If she is not sure on the history then she will need to come by the lab and have this additional labwork drawn.

## 2023-06-20 ENCOUNTER — TELEPHONE (OUTPATIENT)
Dept: NEUROLOGY | Facility: CLINIC | Age: 63
End: 2023-06-20
Payer: MEDICARE

## 2023-06-20 NOTE — TELEPHONE ENCOUNTER
Called pt and gave her the information below from JENNIFER Forde NP. She states Dr Serafin Ruiz is her PMD and please fax results to him. She v/u.            ----- Message from DUY Parks sent at 6/20/2023 12:55 PM CDT -----  Labs imply possible prior infection with syphilis, I would recommend patient follow with primary care for further advisement and possible treatment.  We can send them the results if they are not in our system

## 2023-08-03 ENCOUNTER — OFFICE VISIT (OUTPATIENT)
Dept: NEUROLOGY | Facility: CLINIC | Age: 63
End: 2023-08-03
Payer: MEDICARE

## 2023-08-03 VITALS
SYSTOLIC BLOOD PRESSURE: 132 MMHG | BODY MASS INDEX: 30.66 KG/M2 | WEIGHT: 184 LBS | HEIGHT: 65 IN | HEART RATE: 81 BPM | OXYGEN SATURATION: 97 % | DIASTOLIC BLOOD PRESSURE: 90 MMHG

## 2023-08-03 DIAGNOSIS — R20.2 PARESTHESIA: Primary | ICD-10-CM

## 2023-08-03 DIAGNOSIS — M54.12 CERVICAL RADICULOPATHY: ICD-10-CM

## 2023-08-03 PROCEDURE — 3080F DIAST BP >= 90 MM HG: CPT | Mod: CPTII,,, | Performed by: NURSE PRACTITIONER

## 2023-08-03 PROCEDURE — 3288F PR FALLS RISK ASSESSMENT DOCUMENTED: ICD-10-PCS | Mod: CPTII,,, | Performed by: NURSE PRACTITIONER

## 2023-08-03 PROCEDURE — 3008F BODY MASS INDEX DOCD: CPT | Mod: CPTII,,, | Performed by: NURSE PRACTITIONER

## 2023-08-03 PROCEDURE — 3008F PR BODY MASS INDEX (BMI) DOCUMENTED: ICD-10-PCS | Mod: CPTII,,, | Performed by: NURSE PRACTITIONER

## 2023-08-03 PROCEDURE — 1160F PR REVIEW ALL MEDS BY PRESCRIBER/CLIN PHARMACIST DOCUMENTED: ICD-10-PCS | Mod: CPTII,,, | Performed by: NURSE PRACTITIONER

## 2023-08-03 PROCEDURE — 1160F RVW MEDS BY RX/DR IN RCRD: CPT | Mod: CPTII,,, | Performed by: NURSE PRACTITIONER

## 2023-08-03 PROCEDURE — 3288F FALL RISK ASSESSMENT DOCD: CPT | Mod: CPTII,,, | Performed by: NURSE PRACTITIONER

## 2023-08-03 PROCEDURE — 4010F ACE/ARB THERAPY RXD/TAKEN: CPT | Mod: CPTII,,, | Performed by: NURSE PRACTITIONER

## 2023-08-03 PROCEDURE — 99213 OFFICE O/P EST LOW 20 MIN: CPT | Mod: S$PBB,,, | Performed by: NURSE PRACTITIONER

## 2023-08-03 PROCEDURE — 99215 OFFICE O/P EST HI 40 MIN: CPT | Mod: PBBFAC | Performed by: NURSE PRACTITIONER

## 2023-08-03 PROCEDURE — 1101F PR PT FALLS ASSESS DOC 0-1 FALLS W/OUT INJ PAST YR: ICD-10-PCS | Mod: CPTII,,, | Performed by: NURSE PRACTITIONER

## 2023-08-03 PROCEDURE — 3075F PR MOST RECENT SYSTOLIC BLOOD PRESS GE 130-139MM HG: ICD-10-PCS | Mod: CPTII,,, | Performed by: NURSE PRACTITIONER

## 2023-08-03 PROCEDURE — 4010F PR ACE/ARB THEARPY RXD/TAKEN: ICD-10-PCS | Mod: CPTII,,, | Performed by: NURSE PRACTITIONER

## 2023-08-03 PROCEDURE — 3080F PR MOST RECENT DIASTOLIC BLOOD PRESSURE >= 90 MM HG: ICD-10-PCS | Mod: CPTII,,, | Performed by: NURSE PRACTITIONER

## 2023-08-03 PROCEDURE — 3075F SYST BP GE 130 - 139MM HG: CPT | Mod: CPTII,,, | Performed by: NURSE PRACTITIONER

## 2023-08-03 PROCEDURE — 1159F MED LIST DOCD IN RCRD: CPT | Mod: CPTII,,, | Performed by: NURSE PRACTITIONER

## 2023-08-03 PROCEDURE — 1101F PT FALLS ASSESS-DOCD LE1/YR: CPT | Mod: CPTII,,, | Performed by: NURSE PRACTITIONER

## 2023-08-03 PROCEDURE — 99213 PR OFFICE/OUTPT VISIT, EST, LEVL III, 20-29 MIN: ICD-10-PCS | Mod: S$PBB,,, | Performed by: NURSE PRACTITIONER

## 2023-08-03 PROCEDURE — 1159F PR MEDICATION LIST DOCUMENTED IN MEDICAL RECORD: ICD-10-PCS | Mod: CPTII,,, | Performed by: NURSE PRACTITIONER

## 2023-08-03 RX ORDER — DOXYCYCLINE 100 MG/1
CAPSULE ORAL
COMMUNITY
Start: 2023-07-20 | End: 2024-02-07 | Stop reason: ALTCHOICE

## 2023-08-03 RX ORDER — GABAPENTIN 100 MG/1
100 CAPSULE ORAL 3 TIMES DAILY
Qty: 90 CAPSULE | Refills: 11 | Status: SHIPPED | OUTPATIENT
Start: 2023-08-03 | End: 2023-11-07

## 2023-08-03 NOTE — PATIENT INSTRUCTIONS
Try again to start neurontin 100mg three times daily  EMG/NCS of the RUE and RLE here in [place]  Followup in 3 months

## 2023-08-03 NOTE — PROGRESS NOTES
Subjective:       Patient ID: Radha Diallo is a 63 y.o. female     Chief Complaint:    Chief Complaint   Patient presents with    Follow-up        Allergies:  Patient has no known allergies.    Current Medications:    Outpatient Encounter Medications as of 8/3/2023   Medication Sig Dispense Refill    calcium carbonate-vitamin D3 (CALTRATE 600 PLUS D) 600 mg-20 mcg (800 unit) Chew Caltrate 600 plus D  600 mg-20 mcg (800 unit) chewable tablet   2 tablets po qd      cyclobenzaprine (FLEXERIL) 10 MG tablet cyclobenzaprine 10 mg tablet   TAKE 1 TABLET BY MOUTH THREE TIMES DAILY      doxycycline (VIBRAMYCIN) 100 MG Cap TAKE 1 CAPSULE BY MOUTH TWICE DAILY FOR 14 DAYS      ergocalciferol (ERGOCALCIFEROL) 50,000 unit Cap Take 50,000 Units by mouth every 7 days.      fluticasone propionate (FLONASE) 50 mcg/actuation nasal spray SHAKE LIQUID AND USE 1 SPRAY IN EACH NOSTRIL EVERY DAY AS DIRECTED      fluticasone propionate (FLONASE) 50 mcg/actuation nasal spray fluticasone propionate 50 mcg/actuation nasal spray,suspension   SHAKE LIQUID AND USE 1 SPRAY IN EACH NOSTRIL EVERY DAY AS DIRECTED      hydroCHLOROthiazide (MICROZIDE) 12.5 mg capsule Take 12.5 mg by mouth once daily.      HYDROcodone-acetaminophen (NORCO)  mg per tablet Take 1 tablet by mouth every 12 (twelve) hours as needed for Pain.      lisinopriL 10 MG tablet Take 10 mg by mouth once daily.      lisinopriL-hydrochlorothiazide (PRINZIDE,ZESTORETIC) 10-12.5 mg per tablet lisinopril 10 mg-hydrochlorothiazide 12.5 mg tablet   TAKE ONE TABLET BY MOUTH ONE TIME DAILY      loratadine (CLARITIN) 10 mg tablet Take 10 mg by mouth daily as needed.      methylPREDNISolone (MEDROL DOSEPACK) 4 mg tablet TAKE AS DIRECTED ON PACKAGE, TAKE WITH FOOD      oxybutynin (DITROPAN-XL) 5 MG TR24 Take 5 mg by mouth once daily.      potassium chloride (MICRO-K) 10 MEQ CpSR Take 10 mEq by mouth once.      promethazine-dextromethorphan (PROMETHAZINE-DM) 6.25-15 mg/5 mL Syrp  promethazine-DM 6.25 mg-15 mg/5 mL oral syrup   TAKE 5 ML BY MOUTH EVERY 4 HOURS FOR 15 DAYS AS NEEDED      rosuvastatin (CRESTOR) 10 MG tablet rosuvastatin 10 mg tablet   TAKE ONE TABLET BY MOUTH AT BEDTIME      tiZANidine (ZANAFLEX) 4 MG tablet tizanidine 4 mg tablet   take ONE tablet by MOUTH ONCE daily as needed FOR muscle SPASMS      gabapentin (NEURONTIN) 100 MG capsule Take 1 capsule (100 mg total) by mouth 3 (three) times daily. 90 capsule 11     No facility-administered encounter medications on file as of 8/3/2023.       History of Present Illness  64 y/o female following in neurology for reported radiculopathy.    She is followed in pain treatment in [place], but she lives in [place].  Apparently her UDS did not reflect she was taking her meds correctly with Dr. Miller.    She presented in early 2023 with reported numbness and burning in all 4 extremities.  Ongoing at that time about 3 months.  No prior diabetes, back or neck injury though she has chronic neck and back pain, thus followed in pain treatment.  Reported symptoms of radiculopathy in the BLE and BUE.  She was already doing PT per the records for neck and back pain.  Prescribed neurontin 100mg tid but was not taking.  She reports is taking something else, but did not bring her meds so we could not verify what it was.  Today she says only taking norco from her pain treatment, so I am again starting her with neurontin 100mg to taper to TID.    She was referred to Dr. Dolan for EMG studies, but no reports, ultimately she never had it done.  I am setting her up here to have EMG/NCS of the RUE and RLE given that her symptoms are the same on both sides and in upper and lower ext.    She had extensive labs ordered but they were not obtained by the patient.  She reported she forgot about both of them, so last visit we reordered the labs.  The findings showed syphilis was positive, but RPR found negative.  Obtained TP-PA per recommendation of lab which  was noted positive, and we contacted her with this information on 6/20/23 and instructed to follow with primary care and faxed labs to their office, Dr. Serafin Ruiz.  She reports they did treat her with medication           Review of Systems  Review of Systems   Constitutional:  Negative for diaphoresis and fever.   HENT:  Negative for congestion, hearing loss and tinnitus.    Eyes:  Negative for blurred vision, double vision, photophobia, discharge and redness.   Respiratory:  Negative for cough and shortness of breath.    Cardiovascular:  Negative for chest pain.   Gastrointestinal:  Negative for abdominal pain, nausea and vomiting.   Musculoskeletal:  Positive for back pain and neck pain. Negative for joint pain and myalgias.   Skin:  Negative for itching and rash.   Neurological:  Positive for tingling and sensory change. Negative for dizziness, tremors, speech change, focal weakness, seizures, loss of consciousness, weakness and headaches.   Psychiatric/Behavioral:  Negative for depression, hallucinations and memory loss. The patient does not have insomnia.    All other systems reviewed and are negative.     Objective:     NEUROLOGICAL EXAMINATION:     MENTAL STATUS   Oriented to person, place, and time.   Registration: recalls 3 of 3 objects. Recall at 5 minutes: recalls 3 of 3 objects.   Attention: normal. Concentration: normal.   Speech: speech is normal   Level of consciousness: alert  Knowledge: good and consistent with education.   Normal comprehension.     CRANIAL NERVES     CN II   Visual fields full to confrontation.   Visual acuity: normal  Right visual field deficit: none  Left visual field deficit: none     CN III, IV, VI   Pupils are equal, round, and reactive to light.  Extraocular motions are normal.   Right pupil: Size: 3 mm. Shape: regular. Reactivity: brisk. Consensual response: intact. Accommodation: intact.   Left pupil: Size: 3 mm. Shape: regular. Reactivity: brisk. Consensual response:  intact. Accommodation: intact.   CN III: no CN III palsy  CN VI: no CN VI palsy  Nystagmus: none   Diplopia: none  Upgaze: normal  Downgaze: normal  Conjugate gaze: present  Vestibulo-ocular reflex: present    CN V   Facial sensation intact.   Right facial sensation deficit: none  Left facial sensation deficit: none  Right corneal reflex: normal  Left corneal reflex: normal    CN VII   Facial expression full, symmetric.   Right facial weakness: none  Left facial weakness: none  Right taste: normal  Left taste: normal    CN VIII   CN VIII normal.   Hearing: intact    CN IX, X   CN IX normal.   CN X normal.   Palate: symmetric    CN XI   CN XI normal.   Right sternocleidomastoid strength: normal  Left sternocleidomastoid strength: normal  Right trapezius strength: normal  Left trapezius strength: normal    CN XII   CN XII normal.   Tongue: not atrophic  Fasciculations: absent  Tongue deviation: none    MOTOR EXAM   Muscle bulk: normal  Overall muscle tone: normal  Right arm tone: normal  Left arm tone: normal  Right arm pronator drift: absent  Left arm pronator drift: absent  Right leg tone: normal  Left leg tone: normal    Strength   Right neck flexion: 5/5  Left neck flexion: 5/5  Right neck extension: 5/5  Left neck extension: 5/5  Right deltoid: 5/5  Left deltoid: 5/5  Right biceps: 5/5  Left biceps: 5/5  Right triceps: 5/5  Left triceps: 5/5  Right wrist flexion: 5/5  Left wrist flexion: 5/5  Right wrist extension: 5/5  Left wrist extension: 5/5  Right interossei: 5/5  Left interossei: 5/5  Right iliopsoas: 5/5  Left iliopsoas: 5/5  Right quadriceps: 5/5  Left quadriceps: 5/5  Right hamstrin/5  Left hamstrin/5  Right anterior tibial: 5/5  Left anterior tibial: 5/5  Right posterior tibial: 5/5  Left posterior tibial: 5/5  Right gastroc: 5/5  Left gastroc: 5/5    REFLEXES     Reflexes   Right brachioradialis: 2+  Left brachioradialis: 2+  Right biceps: 2+  Left biceps: 2+  Right triceps: 2+  Left triceps:  2+  Right patellar: 2+  Left patellar: 2+  Right achilles: 2+  Left achilles: 2+  Right plantar: normal  Left plantar: normal  Right Montejo: absent  Left Montejo: absent  Right ankle clonus: absent  Left ankle clonus: absent  Right pendular knee jerk: absent  Left pendular knee jerk: absent    SENSORY EXAM   Light touch normal.   Right arm light touch: normal  Left arm light touch: normal  Right leg light touch: normal  Left leg light touch: normal  Vibration normal.   Right arm vibration: normal  Left arm vibration: normal  Right leg vibration: normal  Left leg vibration: normal  Proprioception normal.   Right arm proprioception: normal  Left arm proprioception: normal  Right leg proprioception: normal  Left leg proprioception: normal  Pinprick normal.   Right arm pinprick: normal  Left arm pinprick: normal  Right leg pinprick: normal  Left leg pinprick: normal  Graphesthesia: normal  Romberg: negative  Stereognosis: normal    GAIT AND COORDINATION     Gait  Gait: normal     Coordination   Finger to nose coordination: normal  Heel to shin coordination: normal  Tandem walking coordination: normal    Tremor   Resting tremor: absent  Intention tremor: absent  Action tremor: absent       Physical Exam  Vitals and nursing note reviewed.   Constitutional:       Appearance: Normal appearance.   HENT:      Head: Normocephalic.   Eyes:      Extraocular Movements: Extraocular movements intact and EOM normal.      Pupils: Pupils are equal, round, and reactive to light.   Cardiovascular:      Rate and Rhythm: Normal rate and regular rhythm.   Pulmonary:      Effort: Pulmonary effort is normal.      Breath sounds: Normal breath sounds.   Musculoskeletal:         General: No swelling or tenderness. Normal range of motion.      Cervical back: Normal range of motion and neck supple.      Right lower leg: No edema.      Left lower leg: No edema.   Skin:     General: Skin is warm and dry.      Coloration: Skin is not jaundiced.       Findings: No rash.   Neurological:      General: No focal deficit present.      Mental Status: She is alert and oriented to person, place, and time.      GCS: GCS eye subscore is 4. GCS verbal subscore is 5. GCS motor subscore is 6.      Cranial Nerves: No cranial nerve deficit.      Sensory: No sensory deficit.      Motor: Motor function is intact. No weakness.      Coordination: Coordination is intact. Coordination normal. Finger-Nose-Finger Test, Heel to Shin Test and Romberg Test normal.      Gait: Gait is intact. Gait and tandem walk normal.      Deep Tendon Reflexes: Reflexes normal.      Reflex Scores:       Tricep reflexes are 2+ on the right side and 2+ on the left side.       Bicep reflexes are 2+ on the right side and 2+ on the left side.       Brachioradialis reflexes are 2+ on the right side and 2+ on the left side.       Patellar reflexes are 2+ on the right side and 2+ on the left side.       Achilles reflexes are 2+ on the right side and 2+ on the left side.  Psychiatric:         Mood and Affect: Mood normal.         Speech: Speech normal.         Behavior: Behavior normal.          Assessment:     Problem List Items Addressed This Visit          Neuro    Paresthesia - Primary    Relevant Medications    gabapentin (NEURONTIN) 100 MG capsule    Other Relevant Orders    EMG W/ ULTRASOUND AND NERVE CONDUCTION TEST 2 Extremities    Cervical radiculopathy    Relevant Medications    gabapentin (NEURONTIN) 100 MG capsule    Other Relevant Orders    EMG W/ ULTRASOUND AND NERVE CONDUCTION TEST 2 Extremities          Primary Diagnosis and ICD10  Paresthesia [R20.2]    Plan:     There are no Patient Instructions on file for this visit.    There are no discontinued medications.      Requested Prescriptions     Signed Prescriptions Disp Refills    gabapentin (NEURONTIN) 100 MG capsule 90 capsule 11     Sig: Take 1 capsule (100 mg total) by mouth 3 (three) times daily.       Orders Placed This Encounter    Procedures    EMG W/ ULTRASOUND AND NERVE CONDUCTION TEST 2 Extremities

## 2023-08-09 DIAGNOSIS — Z71.89 COMPLEX CARE COORDINATION: ICD-10-CM

## 2023-08-15 ENCOUNTER — PROCEDURE VISIT (OUTPATIENT)
Dept: NEUROLOGY | Facility: CLINIC | Age: 63
End: 2023-08-15
Payer: MEDICARE

## 2023-08-15 DIAGNOSIS — R20.0 NUMBNESS AND TINGLING OF BOTH UPPER EXTREMITIES: Primary | ICD-10-CM

## 2023-08-15 DIAGNOSIS — R20.2 NUMBNESS AND TINGLING OF BOTH UPPER EXTREMITIES: Primary | ICD-10-CM

## 2023-08-15 DIAGNOSIS — R20.2 PARESTHESIA: ICD-10-CM

## 2023-08-15 DIAGNOSIS — M54.12 CERVICAL RADICULOPATHY: ICD-10-CM

## 2023-08-15 PROCEDURE — 95886 MUSC TEST DONE W/N TEST COMP: CPT | Mod: PBBFAC | Performed by: PSYCHIATRY & NEUROLOGY

## 2023-08-15 PROCEDURE — 95886 MUSC TEST DONE W/N TEST COMP: CPT | Mod: 26,S$PBB,, | Performed by: PSYCHIATRY & NEUROLOGY

## 2023-08-15 PROCEDURE — 95910 PR NERVE CONDUCTION STUDY; 7-8 STUDIES: ICD-10-PCS | Mod: 26,S$PBB,, | Performed by: PSYCHIATRY & NEUROLOGY

## 2023-08-15 PROCEDURE — 95886 PR EMG COMPLETE, W/ NERVE CONDUCTION STUDIES, 5+ MUSCLES: ICD-10-PCS | Mod: 26,S$PBB,, | Performed by: PSYCHIATRY & NEUROLOGY

## 2023-08-15 PROCEDURE — 95911 NRV CNDJ TEST 9-10 STUDIES: CPT | Mod: PBBFAC

## 2023-08-15 PROCEDURE — 95910 NRV CNDJ TEST 7-8 STUDIES: CPT | Mod: 26,S$PBB,, | Performed by: PSYCHIATRY & NEUROLOGY

## 2023-11-07 ENCOUNTER — OFFICE VISIT (OUTPATIENT)
Dept: NEUROLOGY | Facility: CLINIC | Age: 63
End: 2023-11-07
Payer: MEDICARE

## 2023-11-07 VITALS
DIASTOLIC BLOOD PRESSURE: 76 MMHG | WEIGHT: 183 LBS | HEIGHT: 65 IN | BODY MASS INDEX: 30.49 KG/M2 | OXYGEN SATURATION: 97 % | HEART RATE: 99 BPM | SYSTOLIC BLOOD PRESSURE: 138 MMHG

## 2023-11-07 DIAGNOSIS — G56.03 CARPAL TUNNEL SYNDROME ON BOTH SIDES: Primary | ICD-10-CM

## 2023-11-07 PROCEDURE — 1159F MED LIST DOCD IN RCRD: CPT | Mod: CPTII,,, | Performed by: NURSE PRACTITIONER

## 2023-11-07 PROCEDURE — 3008F PR BODY MASS INDEX (BMI) DOCUMENTED: ICD-10-PCS | Mod: CPTII,,, | Performed by: NURSE PRACTITIONER

## 2023-11-07 PROCEDURE — 99213 PR OFFICE/OUTPT VISIT, EST, LEVL III, 20-29 MIN: ICD-10-PCS | Mod: S$PBB,,, | Performed by: NURSE PRACTITIONER

## 2023-11-07 PROCEDURE — 3008F BODY MASS INDEX DOCD: CPT | Mod: CPTII,,, | Performed by: NURSE PRACTITIONER

## 2023-11-07 PROCEDURE — 4010F ACE/ARB THERAPY RXD/TAKEN: CPT | Mod: CPTII,,, | Performed by: NURSE PRACTITIONER

## 2023-11-07 PROCEDURE — 3288F PR FALLS RISK ASSESSMENT DOCUMENTED: ICD-10-PCS | Mod: CPTII,,, | Performed by: NURSE PRACTITIONER

## 2023-11-07 PROCEDURE — 1160F PR REVIEW ALL MEDS BY PRESCRIBER/CLIN PHARMACIST DOCUMENTED: ICD-10-PCS | Mod: CPTII,,, | Performed by: NURSE PRACTITIONER

## 2023-11-07 PROCEDURE — 3288F FALL RISK ASSESSMENT DOCD: CPT | Mod: CPTII,,, | Performed by: NURSE PRACTITIONER

## 2023-11-07 PROCEDURE — 99214 OFFICE O/P EST MOD 30 MIN: CPT | Mod: PBBFAC | Performed by: NURSE PRACTITIONER

## 2023-11-07 PROCEDURE — 3075F PR MOST RECENT SYSTOLIC BLOOD PRESS GE 130-139MM HG: ICD-10-PCS | Mod: CPTII,,, | Performed by: NURSE PRACTITIONER

## 2023-11-07 PROCEDURE — 1101F PR PT FALLS ASSESS DOC 0-1 FALLS W/OUT INJ PAST YR: ICD-10-PCS | Mod: CPTII,,, | Performed by: NURSE PRACTITIONER

## 2023-11-07 PROCEDURE — 1160F RVW MEDS BY RX/DR IN RCRD: CPT | Mod: CPTII,,, | Performed by: NURSE PRACTITIONER

## 2023-11-07 PROCEDURE — 99213 OFFICE O/P EST LOW 20 MIN: CPT | Mod: S$PBB,,, | Performed by: NURSE PRACTITIONER

## 2023-11-07 PROCEDURE — 3075F SYST BP GE 130 - 139MM HG: CPT | Mod: CPTII,,, | Performed by: NURSE PRACTITIONER

## 2023-11-07 PROCEDURE — 3078F DIAST BP <80 MM HG: CPT | Mod: CPTII,,, | Performed by: NURSE PRACTITIONER

## 2023-11-07 PROCEDURE — 3078F PR MOST RECENT DIASTOLIC BLOOD PRESSURE < 80 MM HG: ICD-10-PCS | Mod: CPTII,,, | Performed by: NURSE PRACTITIONER

## 2023-11-07 PROCEDURE — 1101F PT FALLS ASSESS-DOCD LE1/YR: CPT | Mod: CPTII,,, | Performed by: NURSE PRACTITIONER

## 2023-11-07 PROCEDURE — 1159F PR MEDICATION LIST DOCUMENTED IN MEDICAL RECORD: ICD-10-PCS | Mod: CPTII,,, | Performed by: NURSE PRACTITIONER

## 2023-11-07 PROCEDURE — 4010F PR ACE/ARB THEARPY RXD/TAKEN: ICD-10-PCS | Mod: CPTII,,, | Performed by: NURSE PRACTITIONER

## 2023-11-07 RX ORDER — DULOXETIN HYDROCHLORIDE 30 MG/1
30 CAPSULE, DELAYED RELEASE ORAL DAILY
Qty: 30 CAPSULE | Refills: 11 | Status: SHIPPED | OUTPATIENT
Start: 2023-11-07 | End: 2024-02-07

## 2023-11-07 NOTE — PROGRESS NOTES
Subjective:       Patient ID: Radha Diallo is a 63 y.o. female     Chief Complaint:    Chief Complaint   Patient presents with    Follow-up     Pt states that pain is getting worse in R shoulder and R knee and the Neurontin makes her nauseated and she is unable to take it.        Allergies:  Patient has no known allergies.    Current Medications:    Outpatient Encounter Medications as of 11/7/2023   Medication Sig Dispense Refill    calcium carbonate-vitamin D3 (CALTRATE 600 PLUS D) 600 mg-20 mcg (800 unit) Chew Caltrate 600 plus D  600 mg-20 mcg (800 unit) chewable tablet   2 tablets po qd      cyclobenzaprine (FLEXERIL) 10 MG tablet cyclobenzaprine 10 mg tablet   TAKE 1 TABLET BY MOUTH THREE TIMES DAILY      doxycycline (VIBRAMYCIN) 100 MG Cap TAKE 1 CAPSULE BY MOUTH TWICE DAILY FOR 14 DAYS      ergocalciferol (ERGOCALCIFEROL) 50,000 unit Cap Take 50,000 Units by mouth every 7 days.      hydroCHLOROthiazide (MICROZIDE) 12.5 mg capsule Take 12.5 mg by mouth once daily.      HYDROcodone-acetaminophen (NORCO)  mg per tablet Take 1 tablet by mouth every 12 (twelve) hours as needed for Pain.      lisinopriL 10 MG tablet Take 10 mg by mouth once daily.      lisinopriL-hydrochlorothiazide (PRINZIDE,ZESTORETIC) 10-12.5 mg per tablet lisinopril 10 mg-hydrochlorothiazide 12.5 mg tablet   TAKE ONE TABLET BY MOUTH ONE TIME DAILY      rosuvastatin (CRESTOR) 10 MG tablet rosuvastatin 10 mg tablet   TAKE ONE TABLET BY MOUTH AT BEDTIME      [DISCONTINUED] tiZANidine (ZANAFLEX) 4 MG tablet tizanidine 4 mg tablet   take ONE tablet by MOUTH ONCE daily as needed FOR muscle SPASMS      DULoxetine (CYMBALTA) 30 MG capsule Take 1 capsule (30 mg total) by mouth once daily. 30 capsule 11    [DISCONTINUED] fluticasone propionate (FLONASE) 50 mcg/actuation nasal spray SHAKE LIQUID AND USE 1 SPRAY IN EACH NOSTRIL EVERY DAY AS DIRECTED      [DISCONTINUED] fluticasone propionate (FLONASE) 50 mcg/actuation nasal spray  fluticasone propionate 50 mcg/actuation nasal spray,suspension   SHAKE LIQUID AND USE 1 SPRAY IN EACH NOSTRIL EVERY DAY AS DIRECTED      [DISCONTINUED] gabapentin (NEURONTIN) 100 MG capsule Take 1 capsule (100 mg total) by mouth 3 (three) times daily. (Patient not taking: Reported on 11/7/2023) 90 capsule 11    [DISCONTINUED] loratadine (CLARITIN) 10 mg tablet Take 10 mg by mouth daily as needed.      [DISCONTINUED] methylPREDNISolone (MEDROL DOSEPACK) 4 mg tablet TAKE AS DIRECTED ON PACKAGE, TAKE WITH FOOD      [DISCONTINUED] oxybutynin (DITROPAN-XL) 5 MG TR24 Take 5 mg by mouth once daily.      [DISCONTINUED] potassium chloride (MICRO-K) 10 MEQ CpSR Take 10 mEq by mouth once.      [DISCONTINUED] promethazine-dextromethorphan (PROMETHAZINE-DM) 6.25-15 mg/5 mL Syrp promethazine-DM 6.25 mg-15 mg/5 mL oral syrup   TAKE 5 ML BY MOUTH EVERY 4 HOURS FOR 15 DAYS AS NEEDED       No facility-administered encounter medications on file as of 11/7/2023.       History of Present Illness  62 y/o female following in neurology for carpal tunnel syndrome, bilaterally.    She is followed in pain treatment in [place], but she lives in [place].    She presented in early 2023 with reported numbness and burning in all 4 extremities.  Ongoing at that time about 3 months.  No prior diabetes, back or neck injury though she has chronic neck and back pain, thus followed in pain treatment.  Reported symptoms of radiculopathy in the BLE and BUE.  She was already doing PT per the records for neck and back pain.  Prescribed neurontin 100mg tid but was not taking.  She reports is taking something else, but did not bring her meds so we could not verify what it was.  Last visit stated was only taking norco, so I again started neurontin 100mg tid.  Today, not taking it.  She reported it make her nauseated    I obtained EMG/NCS of the BUE on 8/16/23, it showed bilateral carpal tunnel, mild, with no indication of APB muscle changes and no indication  of cervical radiculopathy.  Given this strongly recommend using wrist splints and the prescribed neurontin, which she is no longer taking.  I suggest trial with cymbalta 30mg daily for 2 weeks then twice daily    She had extensive labs ordered but they were not obtained by the patient.  She reported she forgot about both of them, so last visit we reordered the labs.  The findings showed syphilis was positive, but RPR found negative.  Obtained TP-PA per recommendation of lab which was noted positive, and we contacted her with this information on 6/20/23 and instructed to follow with primary care and faxed labs to their office, Dr. Serafin Ruiz.  She reports they did treat her with medication           Review of Systems  Review of Systems   Constitutional:  Negative for diaphoresis and fever.   HENT:  Negative for congestion, hearing loss and tinnitus.    Eyes:  Negative for blurred vision, double vision, photophobia, discharge and redness.   Respiratory:  Negative for cough and shortness of breath.    Cardiovascular:  Negative for chest pain.   Gastrointestinal:  Negative for abdominal pain, nausea and vomiting.   Musculoskeletal:  Positive for back pain and neck pain. Negative for joint pain and myalgias.   Skin:  Negative for itching and rash.   Neurological:  Positive for tingling and sensory change. Negative for dizziness, tremors, speech change, focal weakness, seizures, loss of consciousness, weakness and headaches.   Psychiatric/Behavioral:  Negative for depression, hallucinations and memory loss. The patient does not have insomnia.    All other systems reviewed and are negative.     Objective:     NEUROLOGICAL EXAMINATION:     MENTAL STATUS   Oriented to person, place, and time.   Registration: recalls 3 of 3 objects. Recall at 5 minutes: recalls 3 of 3 objects.   Attention: normal. Concentration: normal.   Speech: speech is normal   Level of consciousness: alert  Knowledge: good and consistent with education.    Normal comprehension.     CRANIAL NERVES     CN II   Visual fields full to confrontation.   Visual acuity: normal  Right visual field deficit: none  Left visual field deficit: none     CN III, IV, VI   Pupils are equal, round, and reactive to light.  Extraocular motions are normal.   Right pupil: Size: 3 mm. Shape: regular. Reactivity: brisk. Consensual response: intact. Accommodation: intact.   Left pupil: Size: 3 mm. Shape: regular. Reactivity: brisk. Consensual response: intact. Accommodation: intact.   CN III: no CN III palsy  CN VI: no CN VI palsy  Nystagmus: none   Diplopia: none  Upgaze: normal  Downgaze: normal  Conjugate gaze: present  Vestibulo-ocular reflex: present    CN V   Facial sensation intact.   Right facial sensation deficit: none  Left facial sensation deficit: none  Right corneal reflex: normal  Left corneal reflex: normal    CN VII   Facial expression full, symmetric.   Right facial weakness: none  Left facial weakness: none  Right taste: normal  Left taste: normal    CN VIII   CN VIII normal.   Hearing: intact    CN IX, X   CN IX normal.   CN X normal.   Palate: symmetric    CN XI   CN XI normal.   Right sternocleidomastoid strength: normal  Left sternocleidomastoid strength: normal  Right trapezius strength: normal  Left trapezius strength: normal    CN XII   CN XII normal.   Tongue: not atrophic  Fasciculations: absent  Tongue deviation: none    MOTOR EXAM   Muscle bulk: normal  Overall muscle tone: normal  Right arm tone: normal  Left arm tone: normal  Right arm pronator drift: absent  Left arm pronator drift: absent  Right leg tone: normal  Left leg tone: normal    Strength   Right neck flexion: 5/5  Left neck flexion: 5/5  Right neck extension: 5/5  Left neck extension: 5/5  Right deltoid: 5/5  Left deltoid: 5/5  Right biceps: 5/5  Left biceps: 5/5  Right triceps: 5/5  Left triceps: 5/5  Right wrist flexion: 5/5  Left wrist flexion: 5/5  Right wrist extension: 5/5  Left wrist extension:  5/5  Right interossei: 5/5  Left interossei: 5/5  Right iliopsoas: 5/5  Left iliopsoas: 5/5  Right quadriceps: 5/5  Left quadriceps: 5/5  Right hamstrin/5  Left hamstrin/5  Right anterior tibial: 5/5  Left anterior tibial: 5/5  Right posterior tibial: 5/5  Left posterior tibial: 5/5  Right gastroc: 5/5  Left gastroc: 5/5    REFLEXES     Reflexes   Right brachioradialis: 2+  Left brachioradialis: 2+  Right biceps: 2+  Left biceps: 2+  Right triceps: 2+  Left triceps: 2+  Right patellar: 2+  Left patellar: 2+  Right achilles: 2+  Left achilles: 2+  Right plantar: normal  Left plantar: normal  Right Montejo: absent  Left Montejo: absent  Right ankle clonus: absent  Left ankle clonus: absent  Right pendular knee jerk: absent  Left pendular knee jerk: absent    SENSORY EXAM   Light touch normal.   Right arm light touch: normal  Left arm light touch: normal  Right leg light touch: normal  Left leg light touch: normal  Vibration normal.   Right arm vibration: normal  Left arm vibration: normal  Right leg vibration: normal  Left leg vibration: normal  Proprioception normal.   Right arm proprioception: normal  Left arm proprioception: normal  Right leg proprioception: normal  Left leg proprioception: normal  Pinprick normal.   Right arm pinprick: normal  Left arm pinprick: normal  Right leg pinprick: normal  Left leg pinprick: normal  Graphesthesia: normal  Romberg: negative  Stereognosis: normal    GAIT AND COORDINATION     Gait  Gait: normal     Coordination   Finger to nose coordination: normal  Heel to shin coordination: normal  Tandem walking coordination: normal    Tremor   Resting tremor: absent  Intention tremor: absent  Action tremor: absent       Physical Exam  Vitals and nursing note reviewed.   Constitutional:       Appearance: Normal appearance.   HENT:      Head: Normocephalic.   Eyes:      Extraocular Movements: Extraocular movements intact and EOM normal.      Pupils: Pupils are equal, round, and  reactive to light.   Cardiovascular:      Rate and Rhythm: Normal rate and regular rhythm.   Pulmonary:      Effort: Pulmonary effort is normal.      Breath sounds: Normal breath sounds.   Musculoskeletal:         General: No swelling or tenderness. Normal range of motion.      Cervical back: Normal range of motion and neck supple.      Right lower leg: No edema.      Left lower leg: No edema.   Skin:     General: Skin is warm and dry.      Coloration: Skin is not jaundiced.      Findings: No rash.   Neurological:      General: No focal deficit present.      Mental Status: She is alert and oriented to person, place, and time.      GCS: GCS eye subscore is 4. GCS verbal subscore is 5. GCS motor subscore is 6.      Cranial Nerves: No cranial nerve deficit.      Sensory: No sensory deficit.      Motor: Motor function is intact. No weakness.      Coordination: Coordination is intact. Coordination normal. Finger-Nose-Finger Test, Heel to Shin Test and Romberg Test normal.      Gait: Gait is intact. Gait and tandem walk normal.      Deep Tendon Reflexes: Reflexes normal.      Reflex Scores:       Tricep reflexes are 2+ on the right side and 2+ on the left side.       Bicep reflexes are 2+ on the right side and 2+ on the left side.       Brachioradialis reflexes are 2+ on the right side and 2+ on the left side.       Patellar reflexes are 2+ on the right side and 2+ on the left side.       Achilles reflexes are 2+ on the right side and 2+ on the left side.  Psychiatric:         Mood and Affect: Mood normal.         Speech: Speech normal.         Behavior: Behavior normal.          Assessment:     Problem List Items Addressed This Visit    None  Visit Diagnoses       Carpal tunnel syndrome on both sides    -  Primary    Relevant Medications    DULoxetine (CYMBALTA) 30 MG capsule                 Primary Diagnosis and ICD10  Carpal tunnel syndrome on both sides [G56.03]    Plan:     Patient Instructions   Cymbalta 30mg once  daily for 2 weeks then take 1 tablet twice daily  Recommend obtain and wear wrist splints to both wrists at night when sleeping  Followup in 3 months    Medications Discontinued During This Encounter   Medication Reason    gabapentin (NEURONTIN) 100 MG capsule Patient no longer taking    fluticasone propionate (FLONASE) 50 mcg/actuation nasal spray Patient no longer taking    fluticasone propionate (FLONASE) 50 mcg/actuation nasal spray Patient no longer taking    loratadine (CLARITIN) 10 mg tablet Patient no longer taking    methylPREDNISolone (MEDROL DOSEPACK) 4 mg tablet Patient no longer taking    oxybutynin (DITROPAN-XL) 5 MG TR24 Patient no longer taking    potassium chloride (MICRO-K) 10 MEQ CpSR Patient no longer taking    promethazine-dextromethorphan (PROMETHAZINE-DM) 6.25-15 mg/5 mL Syrp Patient no longer taking    tiZANidine (ZANAFLEX) 4 MG tablet Patient no longer taking         Requested Prescriptions     Signed Prescriptions Disp Refills    DULoxetine (CYMBALTA) 30 MG capsule 30 capsule 11     Sig: Take 1 capsule (30 mg total) by mouth once daily.       No orders of the defined types were placed in this encounter.

## 2023-11-07 NOTE — PATIENT INSTRUCTIONS
Cymbalta 30mg once daily for 2 weeks then take 1 tablet twice daily  Recommend obtain and wear wrist splints to both wrists at night when sleeping  Followup in 3 months

## 2023-12-08 LAB — HM MAMMOGRAM: NORMAL

## 2024-02-07 ENCOUNTER — OFFICE VISIT (OUTPATIENT)
Dept: FAMILY MEDICINE | Facility: CLINIC | Age: 64
End: 2024-02-07
Payer: MEDICARE

## 2024-02-07 VITALS
RESPIRATION RATE: 18 BRPM | OXYGEN SATURATION: 97 % | DIASTOLIC BLOOD PRESSURE: 90 MMHG | TEMPERATURE: 99 F | WEIGHT: 182 LBS | HEIGHT: 65 IN | BODY MASS INDEX: 30.32 KG/M2 | SYSTOLIC BLOOD PRESSURE: 154 MMHG | HEART RATE: 68 BPM

## 2024-02-07 DIAGNOSIS — Z11.4 SCREENING FOR HIV WITHOUT PRESENCE OF RISK FACTORS: ICD-10-CM

## 2024-02-07 DIAGNOSIS — Z13.1 SCREENING FOR DIABETES MELLITUS: ICD-10-CM

## 2024-02-07 DIAGNOSIS — Z11.59 ENCOUNTER FOR HEPATITIS C SCREENING TEST FOR LOW RISK PATIENT: ICD-10-CM

## 2024-02-07 DIAGNOSIS — E66.9 CLASS 1 OBESITY WITH BODY MASS INDEX (BMI) OF 30.0 TO 30.9 IN ADULT, UNSPECIFIED OBESITY TYPE, UNSPECIFIED WHETHER SERIOUS COMORBIDITY PRESENT: ICD-10-CM

## 2024-02-07 DIAGNOSIS — E78.01 FAMILIAL HYPERCHOLESTEROLEMIA: ICD-10-CM

## 2024-02-07 DIAGNOSIS — E55.9 VITAMIN D DEFICIENCY: ICD-10-CM

## 2024-02-07 DIAGNOSIS — Z12.11 SCREENING FOR MALIGNANT NEOPLASM OF COLON: ICD-10-CM

## 2024-02-07 DIAGNOSIS — Z76.89 ENCOUNTER TO ESTABLISH CARE: Primary | ICD-10-CM

## 2024-02-07 DIAGNOSIS — I10 ESSENTIAL HYPERTENSION: ICD-10-CM

## 2024-02-07 PROCEDURE — 3288F FALL RISK ASSESSMENT DOCD: CPT | Mod: ,,, | Performed by: STUDENT IN AN ORGANIZED HEALTH CARE EDUCATION/TRAINING PROGRAM

## 2024-02-07 PROCEDURE — 1101F PT FALLS ASSESS-DOCD LE1/YR: CPT | Mod: ,,, | Performed by: STUDENT IN AN ORGANIZED HEALTH CARE EDUCATION/TRAINING PROGRAM

## 2024-02-07 PROCEDURE — 99214 OFFICE O/P EST MOD 30 MIN: CPT | Mod: ,,, | Performed by: STUDENT IN AN ORGANIZED HEALTH CARE EDUCATION/TRAINING PROGRAM

## 2024-02-07 PROCEDURE — 93005 ELECTROCARDIOGRAM TRACING: CPT | Mod: ,,, | Performed by: STUDENT IN AN ORGANIZED HEALTH CARE EDUCATION/TRAINING PROGRAM

## 2024-02-07 PROCEDURE — 3008F BODY MASS INDEX DOCD: CPT | Mod: ,,, | Performed by: STUDENT IN AN ORGANIZED HEALTH CARE EDUCATION/TRAINING PROGRAM

## 2024-02-07 PROCEDURE — 3077F SYST BP >= 140 MM HG: CPT | Mod: ,,, | Performed by: STUDENT IN AN ORGANIZED HEALTH CARE EDUCATION/TRAINING PROGRAM

## 2024-02-07 PROCEDURE — 1159F MED LIST DOCD IN RCRD: CPT | Mod: ,,, | Performed by: STUDENT IN AN ORGANIZED HEALTH CARE EDUCATION/TRAINING PROGRAM

## 2024-02-07 PROCEDURE — 3079F DIAST BP 80-89 MM HG: CPT | Mod: ,,, | Performed by: STUDENT IN AN ORGANIZED HEALTH CARE EDUCATION/TRAINING PROGRAM

## 2024-02-07 RX ORDER — HYDROCODONE BITARTRATE AND ACETAMINOPHEN 10; 325 MG/1; MG/1
1 TABLET ORAL EVERY 8 HOURS PRN
COMMUNITY

## 2024-02-07 NOTE — PATIENT INSTRUCTIONS
Follow a dash diet  Increase physical activity  Goals for your blood pressure are to have this less than or equal to 130/80  Follow up for htn in one month     We will discuss your EKG with cardiology and notify you if you'll need to see them in office.     Return tomorrow am for fasting lab

## 2024-02-07 NOTE — PROGRESS NOTES
[facility] Medicine    Chief Complaint      Chief Complaint   Patient presents with    [facility] Care     Est care with provider; her insurance switched her to over here. Was seeing Dr. Ruiz..States just took BP meds about 1 hr ago.       History of Present Illness      Radha Diallo is a 63 y.o. female with chronic conditions of cervical & lumbar radiculopathy, htn, familial hypercholesterolemia, vitamin d deficiency,   who presents today to [facility] care.  Endorses reflux, states takes otc ppi but is unsure of the name. Also taking otc Vitamin D twice daily.     Pt states is having Cataract surgery on February 28th, [facility] in [place] her eye doctor advised her to have an EKG, Dr. Padron   [facility] [place], [address] [place], [place] [phone].    Follows with pain center in [place]     Past Medical History:  Past Medical History:   Diagnosis Date    Arthritis     Follow-up vaginal Pap smear 07/27/2020    negative    Hyperlipidemia     Hypertension     Hypokalemia     Obesity        Past Surgical History:   has a past surgical history that includes Tubal ligation; Endometrial biopsy (03/24/2017); Oophorectomy (Bilateral, 05/17/2017); and Hysterectomy (05/17/2017).    Social History:  Social History     Tobacco Use    Smoking status: Former     Types: Cigarettes    Smokeless tobacco: Never   Substance Use Topics    Alcohol use: Never    Drug use: Never       I personally reviewed all past medical, surgical, and social.     Review of Systems   Constitutional:  Negative for fatigue and fever.   HENT:  Negative for sore throat.    Respiratory:  Negative for shortness of breath.    Cardiovascular:  Negative for chest pain.   Gastrointestinal:  Positive for reflux. Negative for abdominal pain.   Genitourinary:  Negative for dysuria.   Musculoskeletal:  Negative for back pain.   Integumentary:  Negative for rash.   Neurological:  Negative for headaches.   All other systems reviewed and are negative.        Medications:  Outpatient Encounter Medications as of 2/7/2024   Medication Sig Dispense Refill    rosuvastatin (CRESTOR) 10 MG tablet Take 20 mg by mouth every evening.      [DISCONTINUED] hydroCHLOROthiazide (MICROZIDE) 12.5 mg capsule Take 12.5 mg by mouth once daily.      [DISCONTINUED] HYDROcodone-acetaminophen (NORCO)  mg per tablet Take 1 tablet by mouth every 12 (twelve) hours as needed for Pain.      [DISCONTINUED] lisinopriL 10 MG tablet Take 10 mg by mouth once daily.      calcium carbonate-vitamin D3 (CALTRATE 600 PLUS D) 600 mg-20 mcg (800 unit) Chew Caltrate 600 plus D  600 mg-20 mcg (800 unit) chewable tablet   2 tablets po qd      HYDROcodone-acetaminophen (NORCO)  mg per tablet Take 1 tablet by mouth every 8 (eight) hours as needed for Pain.      lisinopriL-hydrochlorothiazide (PRINZIDE,ZESTORETIC) 10-12.5 mg per tablet lisinopril 10 mg-hydrochlorothiazide 12.5 mg tablet   TAKE ONE TABLET BY MOUTH ONE TIME DAILY      [DISCONTINUED] cyclobenzaprine (FLEXERIL) 10 MG tablet cyclobenzaprine 10 mg tablet   TAKE 1 TABLET BY MOUTH THREE TIMES DAILY      [DISCONTINUED] doxycycline (VIBRAMYCIN) 100 MG Cap TAKE 1 CAPSULE BY MOUTH TWICE DAILY FOR 14 DAYS      [DISCONTINUED] DULoxetine (CYMBALTA) 30 MG capsule Take 1 capsule (30 mg total) by mouth once daily. (Patient not taking: Reported on 2/7/2024) 30 capsule 11    [DISCONTINUED] ergocalciferol (ERGOCALCIFEROL) 50,000 unit Cap Take 50,000 Units by mouth every 7 days.       No facility-administered encounter medications on file as of 2/7/2024.       Allergies:  Review of patient's allergies indicates:  No Known Allergies    Health Maintenance:    There is no immunization history on file for this patient.   Health Maintenance   Topic Date Due    Mammogram  Never done    DEXA Scan  Never done    Colorectal Cancer Screening  Never done    TETANUS VACCINE  02/07/2025 (Originally 6/20/1978)    Shingles Vaccine (1 of 2) 02/07/2025 (Originally  "6/20/2008)    High Dose Statin  02/07/2025    Lipid Panel  02/08/2029    Hepatitis C Screening  Completed        Physical Exam      Vital Signs  Temp: 98.7 °F (37.1 °C)  Temp Source: Oral  Pulse: 68  Resp: 18  SpO2: 97 %  BP: (!) 154/90  BP Location: Right arm  Patient Position: Sitting  Height and Weight  Height: 5' 5" (165.1 cm)  Weight: 82.6 kg (182 lb)  BSA (Calculated - sq m): 1.95 sq meters  BMI (Calculated): 30.3  Weight in (lb) to have BMI = 25: 149.9]    Physical Exam  Vitals and nursing note reviewed.   Constitutional:       Appearance: Normal appearance.   HENT:      Head: Normocephalic and atraumatic.      Nose: Nose normal.   Eyes:      Conjunctiva/sclera: Conjunctivae normal.   Neck:      Vascular: No carotid bruit.   Cardiovascular:      Rate and Rhythm: Normal rate and regular rhythm.      Heart sounds: Normal heart sounds.   Pulmonary:      Effort: Pulmonary effort is normal.      Breath sounds: Normal breath sounds.   Abdominal:      General: Bowel sounds are normal.      Palpations: Abdomen is soft.   Musculoskeletal:         General: Normal range of motion.      Cervical back: Normal range of motion.      Right lower leg: No edema.      Left lower leg: No edema.   Skin:     General: Skin is warm and dry.      Findings: No rash.   Neurological:      General: No focal deficit present.      Mental Status: She is alert and oriented to person, place, and time. Mental status is at baseline.   Psychiatric:         Mood and Affect: Mood normal.         Behavior: Behavior normal.         Thought Content: Thought content normal.         Judgment: Judgment normal.          Laboratory:  CBC:  Recent Labs   Lab 06/14/23  1125   WBC 4.90   RBC 5.55 H   Hemoglobin 14.2   Hematocrit 44.9   Platelet Count 200   MCV 80.9   MCH 25.6 L   MCHC 31.6 L     CMP:  Recent Labs   Lab 06/14/23  1125 02/08/24  0822   Glucose 117 H 108 H   Calcium 9.6 9.5   Albumin 3.6  --    Total Protein 6.7  --    Sodium 141 138 "   Potassium 3.6 3.7   CO2 31 29   Chloride 110 H 106   BUN 12 11   Alk Phos 113  --    ALT 22  --    AST 19  --    Bilirubin, Total 0.7  --      LIPIDS:  Recent Labs   Lab 06/14/23  1125 02/08/24  0822   TSH 1.120  --    HDL Cholesterol  --  52   Cholesterol  --  184   Triglycerides  --  126   LDL Calculated  --  107   Cholesterol/HDL Ratio (Risk Factor)  --  3.5   Non-HDL  --  132     TSH:  Recent Labs   Lab 06/14/23  1125   TSH 1.120     A1C:        Assessment/Plan     Radha Diallo is a 63 y.o.female with:    1. Encounter to establish care  -     Basic Metabolic Panel; Future  -     Lipid Panel; Future  -     Cologuard Screening (Multitarget Stool DNA); Future; Expected date: 02/07/2024  -     Hepatitis C Antibody; Future; Expected date: 02/07/2024  -     HIV 1/2 Ag/Ab (4th Gen); Future; Expected date: 02/07/2024    2. Essential hypertension  -     Basic Metabolic Panel; Future  -     POCT EKG 12-LEAD (Manually Resulted by Ordering Provider)    3. Familial hypercholesterolemia  -     Basic Metabolic Panel; Future  -     Lipid Panel; Future    4. Class 1 obesity with body mass index (BMI) of 30.0 to 30.9 in adult, unspecified obesity type, unspecified whether serious comorbidity present    5. Vitamin D deficiency    6. Encounter for hepatitis C screening test for low risk patient  -     Hepatitis C Antibody; Future; Expected date: 02/07/2024    7. Screening for HIV without presence of risk factors  -     HIV 1/2 Ag/Ab (4th Gen); Future; Expected date: 02/07/2024    8. Screening for diabetes mellitus    9. Screening for malignant neoplasm of colon  -     Cologuard Screening (Multitarget Stool DNA); Future; Expected date: 02/07/2024         Chronic conditions status updated as per HPI.  Other than changes above, cont current medications and maintain follow up with specialists.  Return to clinic in 1 months Htn.    Patient Instructions   Follow a dash diet  Increase physical activity  Goals for your blood  pressure are to have this less than or equal to 130/80  Follow up for htn in one month     We will discuss your EKG with cardiology and notify you if you'll need to see them in office.     Return tomorrow am for fasting lab         Lilly Jennings, OSWALDP-[facility]

## 2024-02-08 ENCOUNTER — PATIENT OUTREACH (OUTPATIENT)
Dept: ADMINISTRATIVE | Facility: HOSPITAL | Age: 64
End: 2024-02-08

## 2024-02-08 NOTE — LETTER
AUTHORIZATION FOR RELEASE OF   CONFIDENTIAL INFORMATION    Dear [facility],    We are seeing Radha Diallo, date of birth 1960, in the clinic at [facility]. Lilly Jennings FNP is the patient's PCP. Radha Diallo has an outstanding lab/procedure at the time we reviewed her chart. In order to help keep her health information updated, she has authorized us to request the following medical record(s):        ( x )  MAMMOGRAM                                      (  )  COLONOSCOPY      (  )  PAP SMEAR                                          (  )  OUTSIDE LAB RESULTS     (  )  DEXA SCAN                                          (  )  EYE EXAM            (  )  FOOT EXAM                                          (  )  ENTIRE RECORD     (  )  OUTSIDE IMMUNIZATIONS                 (  )  _______________         Please fax records to [facility], McFadden, Veronica, FNP, [phone]     If you have any questions, please contact Saurabh Ta at [phone].           Patient Name: Radha Diallo  : 1960  Patient Phone #: [phone]

## 2024-02-08 NOTE — PROGRESS NOTES
Health maintenance record review for population health care gaps    Population Health Chart Review & Patient Outreach Details      Further Action Needed If Patient Returns Outreach:            Updates Requested / Reviewed:     []  Care Everywhere    []     []  External Sources ([facility], [facility], [facility], etc.)    [] [facility]   [] [facility]   [] Other:    [x]  Care Team Updated   []  Removed  or Duplicate Orders   [x]  Immunization Reconciliation Completed / Queried    [] [place]   [x] [place]   [] [place]   [] [place]      Health Maintenance Topics Addressed and Outreach Outcomes / Actions Taken:             Breast Cancer Screening []  Mammogram Order Placed    [x]  Mammogram Screening Scheduled    []  External Records Requested & Care Team Updated if Applicable    []  External Records Uploaded & Care Team Updated if Applicable    []  Pt Declined Scheduling Mammogram    []  Pt Will Schedule with External Provider / Order Routed & Care Team Updated if Applicable              Cervical Cancer Screening []  Pap Smear Scheduled in Primary Care or OBGYN    []  External Records Requested & Care Team Updated if Applicable       []  External Records Uploaded, Care Team Updated, & History Updated if Applicable    []  Patient Declined Scheduling Pap Smear    []  Patient Will Schedule with External Provider & Care Team Updated if Applicable                  Colorectal Cancer Screening []  Colonoscopy Case Request / Referral / Home Test Order Placed    []  External Records Requested & Care Team Updated if Applicable    []  External Records Uploaded, Care Team Updated, & History Updated if Applicable    []  Patient Declined Completing Colon Cancer Screening    []  Patient Will Schedule with External Provider & Care Team Updated if Applicable    []  Fit Kit Mailed (add the SmartPhrase under additional notes)    []  Reminded Patient to Complete Home Test                Diabetic Eye Exam []  Eye Exam Screening Order  Placed    []  Eye Camera Scheduled or Optometry/Ophthalmology Referral Placed    []  External Records Requested & Care Team Updated if Applicable    []  External Records Uploaded, Care Team Updated, & History Updated if Applicable    []  Patient Declined Scheduling Eye Exam    []  Patient Will Schedule with External Provider & Care Team Updated if Applicable             Blood Pressure Control []  Primary Care Follow Up Visit Scheduled     []  Remote Blood Pressure Reading Captured    []  Patient Declined Remote Reading or Scheduling Appt - Escalated to PCP    []  Patient Will Call Back or Send Portal Message with Reading                 HbA1c & Other Labs []  Overdue Lab(s) Ordered    []  Overdue Lab(s) Scheduled    []  External Records Uploaded & Care Team Updated if Applicable    []  Primary Care Follow Up Visit Scheduled     []  Reminded Patient to Complete A1c Home Test    []  Patient Declined Scheduling Labs or Will Call Back to Schedule    []  Patient Will Schedule with External Provider / Order Routed, & Care Team Updated if Applicable           Primary Care Appointment []  Primary Care Appt Scheduled    []  Patient Declined Scheduling or Will Call Back to Schedule    []  Pt Established with External Provider, Updated Care Team, & Informed Pt to Notify Payor if Applicable           Medication Adherence /    Statin Use []  Primary Care Appointment Scheduled    []  Patient Reminded to  Prescription    []  Patient Declined, Provider Notified if Needed    []  Sent Provider Message to Review to Evaluate Pt for Statin, Add Exclusion Dx Codes, Document   Exclusion in Problem List, Change Statin Intensity Level to Moderate or High Intensity if Applicable                Osteoporosis Screening []  Dexa Order Placed    []  Dexa Appointment Scheduled    []  External Records Requested & Care Team Updated    []  External Records Uploaded, Care Team Updated, & History Updated if Applicable    []  Patient Declined  Scheduling Dexa or Will Call Back to Schedule    []  Patient Will Schedule with External Provider / Order Routed & Care Team Updated if Applicable       Additional Notes:

## 2024-02-11 PROBLEM — E66.9 CLASS 1 OBESITY WITH BODY MASS INDEX (BMI) OF 30.0 TO 30.9 IN ADULT: Status: ACTIVE | Noted: 2024-02-11

## 2024-02-12 ENCOUNTER — TELEPHONE (OUTPATIENT)
Dept: FAMILY MEDICINE | Facility: CLINIC | Age: 64
End: 2024-02-12
Payer: MEDICAID

## 2024-02-12 NOTE — TELEPHONE ENCOUNTER
----- Message from DUY Israel sent at 2/12/2024  9:23 AM CST -----  Please contact the patient and let them know that their results were fine and do not require any change in treatment.

## 2024-02-12 NOTE — TELEPHONE ENCOUNTER
Called and left message on pt's voicemail. Lab results are fine per ABDI Jennings. She is to call the office with any questions or concerns.

## 2024-02-14 ENCOUNTER — TELEPHONE (OUTPATIENT)
Dept: FAMILY MEDICINE | Facility: CLINIC | Age: 64
End: 2024-02-14
Payer: MEDICAID

## 2024-02-22 ENCOUNTER — PATIENT OUTREACH (OUTPATIENT)
Dept: ADMINISTRATIVE | Facility: HOSPITAL | Age: 64
End: 2024-02-22

## 2024-02-23 LAB — NONINV COLON CA DNA+OCC BLD SCRN STL QL: POSITIVE

## 2024-02-26 ENCOUNTER — TELEPHONE (OUTPATIENT)
Dept: FAMILY MEDICINE | Facility: CLINIC | Age: 64
End: 2024-02-26
Payer: MEDICAID

## 2024-02-26 DIAGNOSIS — R19.5 POSITIVE COLORECTAL CANCER SCREENING USING COLOGUARD TEST: Primary | ICD-10-CM

## 2024-02-26 NOTE — TELEPHONE ENCOUNTER
----- Message from DUY Israel sent at 2/26/2024  7:42 AM CST -----  Please contact pt and advise that her cologuard was positive for blood, I'm ordering a colonoscopy as recommended.

## 2024-03-08 DIAGNOSIS — Z12.11 COLON CANCER SCREENING: Primary | ICD-10-CM

## 2024-03-09 DIAGNOSIS — Z71.89 COMPLEX CARE COORDINATION: ICD-10-CM

## 2024-03-10 RX ORDER — POLYETHYLENE GLYCOL 3350, SODIUM SULFATE ANHYDROUS, SODIUM BICARBONATE, SODIUM CHLORIDE, POTASSIUM CHLORIDE 236; 22.74; 6.74; 5.86; 2.97 G/4L; G/4L; G/4L; G/4L; G/4L
4 POWDER, FOR SOLUTION ORAL ONCE
Qty: 4000 ML | Refills: 0 | Status: SHIPPED | OUTPATIENT
Start: 2024-03-10 | End: 2024-03-11

## 2024-03-11 ENCOUNTER — OFFICE VISIT (OUTPATIENT)
Dept: FAMILY MEDICINE | Facility: CLINIC | Age: 64
End: 2024-03-11
Payer: MEDICARE

## 2024-03-11 VITALS
HEART RATE: 64 BPM | DIASTOLIC BLOOD PRESSURE: 82 MMHG | RESPIRATION RATE: 16 BRPM | WEIGHT: 182 LBS | SYSTOLIC BLOOD PRESSURE: 142 MMHG | OXYGEN SATURATION: 97 % | TEMPERATURE: 97 F | BODY MASS INDEX: 30.32 KG/M2 | HEIGHT: 65 IN

## 2024-03-11 DIAGNOSIS — I10 ESSENTIAL HYPERTENSION: Primary | ICD-10-CM

## 2024-03-11 PROCEDURE — 3008F BODY MASS INDEX DOCD: CPT | Mod: ,,, | Performed by: STUDENT IN AN ORGANIZED HEALTH CARE EDUCATION/TRAINING PROGRAM

## 2024-03-11 PROCEDURE — 3077F SYST BP >= 140 MM HG: CPT | Mod: ,,, | Performed by: STUDENT IN AN ORGANIZED HEALTH CARE EDUCATION/TRAINING PROGRAM

## 2024-03-11 PROCEDURE — 99213 OFFICE O/P EST LOW 20 MIN: CPT | Mod: ,,, | Performed by: STUDENT IN AN ORGANIZED HEALTH CARE EDUCATION/TRAINING PROGRAM

## 2024-03-11 PROCEDURE — 4010F ACE/ARB THERAPY RXD/TAKEN: CPT | Mod: ,,, | Performed by: STUDENT IN AN ORGANIZED HEALTH CARE EDUCATION/TRAINING PROGRAM

## 2024-03-11 PROCEDURE — 1159F MED LIST DOCD IN RCRD: CPT | Mod: ,,, | Performed by: STUDENT IN AN ORGANIZED HEALTH CARE EDUCATION/TRAINING PROGRAM

## 2024-03-11 PROCEDURE — 3079F DIAST BP 80-89 MM HG: CPT | Mod: ,,, | Performed by: STUDENT IN AN ORGANIZED HEALTH CARE EDUCATION/TRAINING PROGRAM

## 2024-03-11 RX ORDER — LISINOPRIL AND HYDROCHLOROTHIAZIDE 10; 12.5 MG/1; MG/1
TABLET ORAL
Start: 2024-03-11 | End: 2024-04-12 | Stop reason: SDUPTHER

## 2024-03-11 NOTE — PROGRESS NOTES
[facility]    Chief Complaint      Chief Complaint   Patient presents with    Hypertension     1 month follow up for BP       History of Present Illness      Radha Diallo is a 63 y.o. female with chronic conditions of obesity, radiculopathy, htn,. Familial  who presents today for htn follow up.  Did not take bp medications today. Bp slightly elevated. Denies having any other symptoms or concerns.       Past Medical History:  Past Medical History:   Diagnosis Date    Arthritis     Follow-up vaginal Pap smear 07/27/2020    negative    Hyperlipidemia     Hypertension     Hypokalemia     Obesity        Past Surgical History:   has a past surgical history that includes Tubal ligation; Endometrial biopsy (03/24/2017); Oophorectomy (Bilateral, 05/17/2017); and Hysterectomy (05/17/2017).    Social History:  Social History     Tobacco Use    Smoking status: Former     Types: Cigarettes    Smokeless tobacco: Never   Substance Use Topics    Alcohol use: Never    Drug use: Never       I personally reviewed all past medical, surgical, and social.     Review of Systems   Constitutional:  Negative for fatigue and fever.   HENT:  Negative for sore throat.    Respiratory:  Negative for shortness of breath.    Cardiovascular:  Negative for chest pain.   Gastrointestinal:  Negative for abdominal pain.   Genitourinary:  Negative for dysuria.   Musculoskeletal:  Negative for back pain.   Integumentary:  Negative for rash.   Neurological:  Negative for headaches.   All other systems reviewed and are negative.       Medications:  Outpatient Encounter Medications as of 3/11/2024   Medication Sig Dispense Refill    rosuvastatin (CRESTOR) 10 MG tablet Take 20 mg by mouth every evening.      [DISCONTINUED] lisinopriL-hydrochlorothiazide (PRINZIDE,ZESTORETIC) 10-12.5 mg per tablet lisinopril 10 mg-hydrochlorothiazide 12.5 mg tablet   TAKE ONE TABLET BY MOUTH ONE TIME DAILY      calcium carbonate-vitamin D3 (CALTRATE 600 PLUS  "D) 600 mg-20 mcg (800 unit) Chew Caltrate 600 plus D  600 mg-20 mcg (800 unit) chewable tablet   2 tablets po qd      HYDROcodone-acetaminophen (NORCO)  mg per tablet Take 1 tablet by mouth every 8 (eight) hours as needed for Pain.      [] polyethylene glycol (GOLYTELY) 236-22.74-6.74 -5.86 gram suspension Take 4,000 mLs (4 L total) by mouth once. for 1 dose 4000 mL 0     No facility-administered encounter medications on file as of 3/11/2024.       Allergies:  Review of patient's allergies indicates:  No Known Allergies    Health Maintenance:    There is no immunization history on file for this patient.   Health Maintenance   Topic Date Due    DEXA Scan  Never done    TETANUS VACCINE  2025 (Originally 1978)    Shingles Vaccine (1 of 2) 2025 (Originally 2008)    Mammogram  2024    High Dose Statin  2025    Colorectal Cancer Screening  2027    Lipid Panel  2029    Hepatitis C Screening  Completed        Physical Exam      Vital Signs  Temp: 97.4 °F (36.3 °C)  Temp Source: Oral  Pulse: 64  Resp: 16  SpO2: 97 %  BP: (!) 142/82  BP Location: Left arm  Patient Position: Sitting  Height and Weight  Height: 5' 5" (165.1 cm)  Weight: 82.6 kg (182 lb)  BSA (Calculated - sq m): 1.95 sq meters  BMI (Calculated): 30.3  Weight in (lb) to have BMI = 25: 149.9]    Physical Exam  Vitals and nursing note reviewed.   Constitutional:       Appearance: Normal appearance.   HENT:      Head: Normocephalic and atraumatic.   Cardiovascular:      Rate and Rhythm: Normal rate and regular rhythm.   Pulmonary:      Effort: Pulmonary effort is normal.      Breath sounds: Normal breath sounds.   Musculoskeletal:      Right lower leg: No edema.      Left lower leg: No edema.   Skin:     General: Skin is warm and dry.   Neurological:      General: No focal deficit present.      Mental Status: She is alert and oriented to person, place, and time. Mental status is at baseline.      "     Laboratory:  CBC:  Recent Labs   Lab 06/14/23  1125   WBC 4.90   RBC 5.55 H   Hemoglobin 14.2   Hematocrit 44.9   Platelet Count 200   MCV 80.9   MCH 25.6 L   MCHC 31.6 L     CMP:  Recent Labs   Lab 06/14/23  1125 02/08/24  0822   Glucose 117 H 108 H   Calcium 9.6 9.5   Albumin 3.6  --    Total Protein 6.7  --    Sodium 141 138   Potassium 3.6 3.7   CO2 31 29   Chloride 110 H 106   BUN 12 11   Alk Phos 113  --    ALT 22  --    AST 19  --    Bilirubin, Total 0.7  --      LIPIDS:  Recent Labs   Lab 06/14/23  1125 02/08/24  0822   TSH 1.120  --    HDL Cholesterol  --  52   Cholesterol  --  184   Triglycerides  --  126   LDL Calculated  --  107   Cholesterol/HDL Ratio (Risk Factor)  --  3.5   Non-HDL  --  132     TSH:  Recent Labs   Lab 06/14/23  1125   TSH 1.120     A1C:        Assessment/Plan     Radha Diallo is a 63 y.o.female with:    1. Essential hypertension      -   take lisinopril hctz as ordered       Chronic conditions status updated as per HPI.  Other than changes above, cont current medications and maintain follow up with specialists.  Return to clinic in 1 months.    Patient Instructions   Many experts define high, elevated, and normal blood pressure as follows:  High - Top number of 130 or above and/or bottom number of 80 or above  Elevated - Top number between 120 and 129 and bottom number of 79 or below  Normal - Top number of 119 or below and bottom number of 79 or below        Can I do anything on my own? -- You have a lot of control over your blood pressure. To lower it:  Lose weight (if you are overweight)  Choose a diet low in fat and rich in fruits, vegetables, and low-fat dairy products  Reduce the amount of salt you eat  Do something active for at least 30 minutes a day on most days of the week  Cut down on alcohol (if you drink more than 2 alcoholic drinks per day)     Lilly Jennings, [facility]

## 2024-03-11 NOTE — PATIENT INSTRUCTIONS
Many experts define high, elevated, and normal blood pressure as follows:  High - Top number of 130 or above and/or bottom number of 80 or above  Elevated - Top number between 120 and 129 and bottom number of 79 or below  Normal - Top number of 119 or below and bottom number of 79 or below        Can I do anything on my own? -- You have a lot of control over your blood pressure. To lower it:  Lose weight (if you are overweight)  Choose a diet low in fat and rich in fruits, vegetables, and low-fat dairy products  Reduce the amount of salt you eat  Do something active for at least 30 minutes a day on most days of the week  Cut down on alcohol (if you drink more than 2 alcoholic drinks per day)

## 2024-04-12 ENCOUNTER — TELEPHONE (OUTPATIENT)
Dept: FAMILY MEDICINE | Facility: CLINIC | Age: 64
End: 2024-04-12
Payer: MEDICARE

## 2024-04-12 ENCOUNTER — APPOINTMENT (OUTPATIENT)
Dept: RADIOLOGY | Facility: CLINIC | Age: 64
End: 2024-04-12
Attending: STUDENT IN AN ORGANIZED HEALTH CARE EDUCATION/TRAINING PROGRAM
Payer: MEDICARE

## 2024-04-12 ENCOUNTER — OFFICE VISIT (OUTPATIENT)
Dept: FAMILY MEDICINE | Facility: CLINIC | Age: 64
End: 2024-04-12
Payer: MEDICARE

## 2024-04-12 VITALS
WEIGHT: 181 LBS | SYSTOLIC BLOOD PRESSURE: 121 MMHG | HEIGHT: 65 IN | DIASTOLIC BLOOD PRESSURE: 78 MMHG | OXYGEN SATURATION: 98 % | HEART RATE: 78 BPM | RESPIRATION RATE: 17 BRPM | BODY MASS INDEX: 30.16 KG/M2 | TEMPERATURE: 98 F

## 2024-04-12 DIAGNOSIS — I10 ESSENTIAL HYPERTENSION: Primary | ICD-10-CM

## 2024-04-12 DIAGNOSIS — E78.01 FAMILIAL HYPERCHOLESTEROLEMIA: ICD-10-CM

## 2024-04-12 DIAGNOSIS — M25.562 CHRONIC PAIN OF LEFT KNEE: ICD-10-CM

## 2024-04-12 DIAGNOSIS — Z13.1 SCREENING FOR DIABETES MELLITUS: ICD-10-CM

## 2024-04-12 DIAGNOSIS — G89.29 CHRONIC PAIN OF LEFT KNEE: ICD-10-CM

## 2024-04-12 DIAGNOSIS — R73.9 HYPERGLYCEMIA: ICD-10-CM

## 2024-04-12 DIAGNOSIS — Z78.0 POST-MENOPAUSAL: ICD-10-CM

## 2024-04-12 DIAGNOSIS — E55.9 VITAMIN D DEFICIENCY: ICD-10-CM

## 2024-04-12 LAB
EST. AVERAGE GLUCOSE BLD GHB EST-MCNC: 131 MG/DL
HBA1C MFR BLD HPLC: 6.2 % (ref 4.5–6.6)

## 2024-04-12 PROCEDURE — 73560 X-RAY EXAM OF KNEE 1 OR 2: CPT | Mod: 26,LT,, | Performed by: RADIOLOGY

## 2024-04-12 PROCEDURE — 96372 THER/PROPH/DIAG INJ SC/IM: CPT | Mod: ,,, | Performed by: STUDENT IN AN ORGANIZED HEALTH CARE EDUCATION/TRAINING PROGRAM

## 2024-04-12 PROCEDURE — 1101F PT FALLS ASSESS-DOCD LE1/YR: CPT | Mod: ,,, | Performed by: STUDENT IN AN ORGANIZED HEALTH CARE EDUCATION/TRAINING PROGRAM

## 2024-04-12 PROCEDURE — 73560 X-RAY EXAM OF KNEE 1 OR 2: CPT | Mod: TC,RHCUB,LT | Performed by: STUDENT IN AN ORGANIZED HEALTH CARE EDUCATION/TRAINING PROGRAM

## 2024-04-12 PROCEDURE — 99214 OFFICE O/P EST MOD 30 MIN: CPT | Mod: 25,,, | Performed by: STUDENT IN AN ORGANIZED HEALTH CARE EDUCATION/TRAINING PROGRAM

## 2024-04-12 PROCEDURE — 1159F MED LIST DOCD IN RCRD: CPT | Mod: ,,, | Performed by: STUDENT IN AN ORGANIZED HEALTH CARE EDUCATION/TRAINING PROGRAM

## 2024-04-12 PROCEDURE — 3008F BODY MASS INDEX DOCD: CPT | Mod: ,,, | Performed by: STUDENT IN AN ORGANIZED HEALTH CARE EDUCATION/TRAINING PROGRAM

## 2024-04-12 PROCEDURE — 83036 HEMOGLOBIN GLYCOSYLATED A1C: CPT | Mod: ,,, | Performed by: CLINICAL MEDICAL LABORATORY

## 2024-04-12 PROCEDURE — 4010F ACE/ARB THERAPY RXD/TAKEN: CPT | Mod: ,,, | Performed by: STUDENT IN AN ORGANIZED HEALTH CARE EDUCATION/TRAINING PROGRAM

## 2024-04-12 PROCEDURE — 3074F SYST BP LT 130 MM HG: CPT | Mod: ,,, | Performed by: STUDENT IN AN ORGANIZED HEALTH CARE EDUCATION/TRAINING PROGRAM

## 2024-04-12 PROCEDURE — 3078F DIAST BP <80 MM HG: CPT | Mod: ,,, | Performed by: STUDENT IN AN ORGANIZED HEALTH CARE EDUCATION/TRAINING PROGRAM

## 2024-04-12 PROCEDURE — 3288F FALL RISK ASSESSMENT DOCD: CPT | Mod: ,,, | Performed by: STUDENT IN AN ORGANIZED HEALTH CARE EDUCATION/TRAINING PROGRAM

## 2024-04-12 RX ORDER — ROSUVASTATIN CALCIUM 10 MG/1
20 TABLET, COATED ORAL NIGHTLY
Qty: 90 TABLET | Refills: 3 | Status: SHIPPED | OUTPATIENT
Start: 2024-04-12 | End: 2024-05-22

## 2024-04-12 RX ORDER — KETOROLAC TROMETHAMINE 30 MG/ML
30 INJECTION, SOLUTION INTRAMUSCULAR; INTRAVENOUS
Status: COMPLETED | OUTPATIENT
Start: 2024-04-12 | End: 2024-04-12

## 2024-04-12 RX ORDER — METHYLPREDNISOLONE 4 MG/1
TABLET ORAL
Qty: 21 EACH | Refills: 0 | Status: SHIPPED | OUTPATIENT
Start: 2024-04-12 | End: 2024-05-03

## 2024-04-12 RX ORDER — LISINOPRIL AND HYDROCHLOROTHIAZIDE 10; 12.5 MG/1; MG/1
TABLET ORAL
Qty: 90 TABLET | Refills: 3
Start: 2024-04-12

## 2024-04-12 RX ADMIN — KETOROLAC TROMETHAMINE 30 MG: 30 INJECTION, SOLUTION INTRAMUSCULAR; INTRAVENOUS at 01:04

## 2024-04-12 NOTE — PROGRESS NOTES
[facility]    Chief Complaint      Chief Complaint   Patient presents with    Follow-up     1 month follow up HTN       History of Present Illness      Radha Diallo is a 63 y.o. female with chronic conditions of radiculopathy, htn, obesity, vit d deficiency, arthiritis,  who presents today for htn also with c/o left knee pain and swelling, imaging indicates arthritis as suspected, pt declines referral to PT.  Blood pressure is excellent today.     Follows with pain management,  states they are prescribing zanaflex and hydrocodone, has appt next month.  Will get x ray today. Knee pain x's 2 years, history of arthritis.     Past Medical History:  Past Medical History:   Diagnosis Date    Arthritis     Follow-up vaginal Pap smear 07/27/2020    negative    Hyperlipidemia     Hypertension     Hypokalemia     Obesity        Past Surgical History:   has a past surgical history that includes Tubal ligation; Endometrial biopsy (03/24/2017); Oophorectomy (Bilateral, 05/17/2017); and Hysterectomy (05/17/2017).    Social History:  Social History     Tobacco Use    Smoking status: Former     Types: Cigarettes    Smokeless tobacco: Never   Substance Use Topics    Alcohol use: Never    Drug use: Never       I personally reviewed all past medical, surgical, and social.     Review of Systems   Constitutional:  Negative for fatigue and fever.   HENT:  Negative for sore throat.    Respiratory:  Negative for shortness of breath.    Cardiovascular:  Negative for chest pain.   Gastrointestinal:  Negative for abdominal pain.   Genitourinary:  Negative for dysuria.   Musculoskeletal:  Positive for arthralgias (left knee) and joint swelling. Negative for back pain.   Integumentary:  Negative for rash.   Neurological:  Negative for headaches.   All other systems reviewed and are negative.       Medications:  Outpatient Encounter Medications as of 4/12/2024   Medication Sig Dispense Refill    calcium carbonate-vitamin D3  "(CALTRATE 600 PLUS D) 600 mg-20 mcg (800 unit) Chew Caltrate 600 plus D  600 mg-20 mcg (800 unit) chewable tablet   2 tablets po qd      HYDROcodone-acetaminophen (NORCO)  mg per tablet Take 1 tablet by mouth every 8 (eight) hours as needed for Pain.      lisinopriL-hydrochlorothiazide (PRINZIDE,ZESTORETIC) 10-12.5 mg per tablet lisinopril 10 mg-hydrochlorothiazide 12.5 mg tablet   TAKE ONE TABLET BY MOUTH ONE TIME DAILY 90 tablet 3    methylPREDNISolone (MEDROL DOSEPACK) 4 mg tablet use as directed 21 each 0    rosuvastatin (CRESTOR) 10 MG tablet Take 2 tablets (20 mg total) by mouth every evening. 90 tablet 3    [DISCONTINUED] lisinopriL-hydrochlorothiazide (PRINZIDE,ZESTORETIC) 10-12.5 mg per tablet lisinopril 10 mg-hydrochlorothiazide 12.5 mg tablet   TAKE ONE TABLET BY MOUTH ONE TIME DAILY      [DISCONTINUED] rosuvastatin (CRESTOR) 10 MG tablet Take 20 mg by mouth every evening.       Facility-Administered Encounter Medications as of 4/12/2024   Medication Dose Route Frequency Provider Last Rate Last Admin    [COMPLETED] ketorolac injection 30 mg  30 mg Intramuscular 1 time in Clinic/HOD Lilly Jennings FNP   30 mg at 04/12/24 1326       Allergies:  Review of patient's allergies indicates:  No Known Allergies    Health Maintenance:    There is no immunization history on file for this patient.   Health Maintenance   Topic Date Due    DEXA Scan  Never done    TETANUS VACCINE  02/07/2025 (Originally 6/20/1978)    Shingles Vaccine (1 of 2) 02/07/2025 (Originally 6/20/2008)    Mammogram  12/08/2024    High Dose Statin  04/12/2025    Colorectal Cancer Screening  02/12/2027    Lipid Panel  02/08/2029    Hepatitis C Screening  Completed        Physical Exam      Vital Signs  Temp: 97.8 °F (36.6 °C)  Temp Source: Oral  Pulse: 78  Resp: 17  SpO2: 98 %  BP: 121/78  BP Location: Left arm  Patient Position: Sitting  Height and Weight  Height: 5' 5" (165.1 cm)  Weight: 82.1 kg (181 lb)  BSA (Calculated - sq m): " 1.94 sq meters  BMI (Calculated): 30.1  Weight in (lb) to have BMI = 25: 149.9]    Physical Exam  Constitutional:       Appearance: Normal appearance. She is obese.   Cardiovascular:      Rate and Rhythm: Normal rate and regular rhythm.   Pulmonary:      Effort: Pulmonary effort is normal.      Breath sounds: Normal breath sounds.   Musculoskeletal:         General: Swelling and tenderness present. No signs of injury.   Skin:     General: Skin is warm and dry.   Neurological:      General: No focal deficit present.      Mental Status: She is alert and oriented to person, place, and time. Mental status is at baseline.          Laboratory:  CBC:  Recent Labs   Lab 06/14/23  1125   WBC 4.90   RBC 5.55 H   Hemoglobin 14.2   Hematocrit 44.9   Platelet Count 200   MCV 80.9   MCH 25.6 L   MCHC 31.6 L     CMP:  Recent Labs   Lab 06/14/23  1125 02/08/24  0822   Glucose 117 H 108 H   Calcium 9.6 9.5   Albumin 3.6  --    Total Protein 6.7  --    Sodium 141 138   Potassium 3.6 3.7   CO2 31 29   Chloride 110 H 106   BUN 12 11   Alk Phos 113  --    ALT 22  --    AST 19  --    Bilirubin, Total 0.7  --      LIPIDS:  Recent Labs   Lab 06/14/23  1125 02/08/24  0822   TSH 1.120  --    HDL Cholesterol  --  52   Cholesterol  --  184   Triglycerides  --  126   LDL Calculated  --  107   Cholesterol/HDL Ratio (Risk Factor)  --  3.5   Non-HDL  --  132     TSH:  Recent Labs   Lab 06/14/23  1125   TSH 1.120     A1C:        Assessment/Plan     Radha Diallo is a 63 y.o.female with:    1. Essential hypertension  -     lisinopriL-hydrochlorothiazide (PRINZIDE,ZESTORETIC) 10-12.5 mg per tablet; lisinopril 10 mg-hydrochlorothiazide 12.5 mg tablet   TAKE ONE TABLET BY MOUTH ONE TIME DAILY  Dispense: 90 tablet; Refill: 3  -     rosuvastatin (CRESTOR) 10 MG tablet; Take 2 tablets (20 mg total) by mouth every evening.  Dispense: 90 tablet; Refill: 3    2. Screening for diabetes mellitus  -     DXA Bone Density Axial Skeleton 1 or more sites;  Future; Expected date: 04/12/2024    3. Vitamin D deficiency    4. Post-menopausal  -     DXA Bone Density Axial Skeleton 1 or more sites; Future; Expected date: 04/12/2024    5. Hyperglycemia  -     Hemoglobin A1C; Future; Expected date: 04/12/2024    6. Familial hypercholesterolemia  -     rosuvastatin (CRESTOR) 10 MG tablet; Take 2 tablets (20 mg total) by mouth every evening.  Dispense: 90 tablet; Refill: 3    7. Chronic pain of left knee  -     X-Ray Knee 1 or 2 View Left; Future; Expected date: 04/12/2024  -     ketorolac injection 30 mg  -     KNEE BRACE FOR HOME USE  -     methylPREDNISolone (MEDROL DOSEPACK) 4 mg tablet; use as directed  Dispense: 21 each; Refill: 0         Chronic conditions status updated as per HPI.  Other than changes above, cont current medications and maintain follow up with specialists.  Return to clinic in 6 months.    Patient Instructions   Change your diet. Lower the calories in your diet. Ask your dietitian to help you set an eating plan that is right for you.  Get enough exercise. Talk to your doctor about the right amount of exercise for you.  Do not smoke.  Limit the amount of beer, wine, and mixed drinks (alcohol) you drink.  Keep a record of your weight. Write down what you eat and drink each day. This may help you be more aware of the choices you are making.  Rest. Allow your injury to heal before you do slow movements.  Place an ice pack or a bag of frozen peas wrapped in a towel over the painful part. Never put ice right on the skin. Do not leave the ice on more than 10 to 15 minutes at a time. Ice after activity may help decrease pain and swelling. Never ice before stretching.  Prop your knee on pillows to help with swelling.  Use a knee brace if the doctor tells you to do this.  Apply tape to the kneecap if your therapist or  teaches you how to do this.  Wear good supportive shoes. Get inserts for your shoes if you have flat feet.  Do exercises for stretching and  strengthening.  Lose weight if you are overweight. Being overweight puts stress on your knees.     Lilly Jennings, FNP-BC  [facility]

## 2024-04-12 NOTE — PATIENT INSTRUCTIONS
Change your diet. Lower the calories in your diet. Ask your dietitian to help you set an eating plan that is right for you.  Get enough exercise. Talk to your doctor about the right amount of exercise for you.  Do not smoke.  Limit the amount of beer, wine, and mixed drinks (alcohol) you drink.  Keep a record of your weight. Write down what you eat and drink each day. This may help you be more aware of the choices you are making.  Rest. Allow your injury to heal before you do slow movements.  Place an ice pack or a bag of frozen peas wrapped in a towel over the painful part. Never put ice right on the skin. Do not leave the ice on more than 10 to 15 minutes at a time. Ice after activity may help decrease pain and swelling. Never ice before stretching.  Prop your knee on pillows to help with swelling.  Use a knee brace if the doctor tells you to do this.  Apply tape to the kneecap if your therapist or  teaches you how to do this.  Wear good supportive shoes. Get inserts for your shoes if you have flat feet.  Do exercises for stretching and strengthening.  Lose weight if you are overweight. Being overweight puts stress on your knees.

## 2024-04-12 NOTE — TELEPHONE ENCOUNTER
----- Message from DUY Israel sent at 4/12/2024  2:02 PM CDT -----  Please let pt know that imaging showed arthritis as suspected: mild tricompartmental osteoarthritic changes.  Let me know if she's interested in Physical therapy in addition to seeing her pain management provider.

## 2024-05-16 ENCOUNTER — TELEPHONE (OUTPATIENT)
Dept: FAMILY MEDICINE | Facility: CLINIC | Age: 64
End: 2024-05-16
Payer: MEDICARE

## 2024-05-22 DIAGNOSIS — I10 ESSENTIAL HYPERTENSION: ICD-10-CM

## 2024-05-22 DIAGNOSIS — E78.01 FAMILIAL HYPERCHOLESTEROLEMIA: ICD-10-CM

## 2024-05-22 RX ORDER — ROSUVASTATIN CALCIUM 20 MG/1
20 TABLET, COATED ORAL NIGHTLY
Qty: 90 TABLET | Refills: 3 | Status: SHIPPED | OUTPATIENT
Start: 2024-05-22

## 2024-05-24 ENCOUNTER — HOSPITAL ENCOUNTER (OUTPATIENT)
Dept: RADIOLOGY | Facility: HOSPITAL | Age: 64
Discharge: HOME | End: 2024-05-24
Attending: STUDENT IN AN ORGANIZED HEALTH CARE EDUCATION/TRAINING PROGRAM
Payer: MEDICARE

## 2024-05-24 DIAGNOSIS — Z78.0 POST-MENOPAUSAL: ICD-10-CM

## 2024-05-24 DIAGNOSIS — Z13.1 SCREENING FOR DIABETES MELLITUS: ICD-10-CM

## 2024-05-24 PROCEDURE — 77080 DXA BONE DENSITY AXIAL: CPT | Mod: 26,,, | Performed by: STUDENT IN AN ORGANIZED HEALTH CARE EDUCATION/TRAINING PROGRAM

## 2024-05-24 PROCEDURE — 77080 DXA BONE DENSITY AXIAL: CPT | Mod: TC

## 2024-06-07 ENCOUNTER — TELEPHONE (OUTPATIENT)
Dept: FAMILY MEDICINE | Facility: CLINIC | Age: 64
End: 2024-06-07
Payer: MEDICARE

## 2024-06-07 NOTE — TELEPHONE ENCOUNTER
----- Message from DUY Israel sent at 5/24/2024  4:34 PM CDT -----  Please contact the patient and let them know that their results were fine and do not require any change in treatment.

## 2024-06-11 ENCOUNTER — OFFICE VISIT (OUTPATIENT)
Dept: FAMILY MEDICINE | Facility: CLINIC | Age: 64
End: 2024-06-11
Payer: MEDICARE

## 2024-06-11 VITALS
DIASTOLIC BLOOD PRESSURE: 79 MMHG | OXYGEN SATURATION: 96 % | HEIGHT: 65 IN | HEART RATE: 74 BPM | BODY MASS INDEX: 30.49 KG/M2 | WEIGHT: 183 LBS | SYSTOLIC BLOOD PRESSURE: 121 MMHG

## 2024-06-11 DIAGNOSIS — N39.0 URINARY TRACT INFECTION WITHOUT HEMATURIA, SITE UNSPECIFIED: ICD-10-CM

## 2024-06-11 DIAGNOSIS — Z09 FOLLOW-UP EXAMINATION: Primary | ICD-10-CM

## 2024-06-11 PROCEDURE — 3288F FALL RISK ASSESSMENT DOCD: CPT | Mod: ,,, | Performed by: STUDENT IN AN ORGANIZED HEALTH CARE EDUCATION/TRAINING PROGRAM

## 2024-06-11 PROCEDURE — 3078F DIAST BP <80 MM HG: CPT | Mod: ,,, | Performed by: STUDENT IN AN ORGANIZED HEALTH CARE EDUCATION/TRAINING PROGRAM

## 2024-06-11 PROCEDURE — 3074F SYST BP LT 130 MM HG: CPT | Mod: ,,, | Performed by: STUDENT IN AN ORGANIZED HEALTH CARE EDUCATION/TRAINING PROGRAM

## 2024-06-11 PROCEDURE — 1159F MED LIST DOCD IN RCRD: CPT | Mod: ,,, | Performed by: STUDENT IN AN ORGANIZED HEALTH CARE EDUCATION/TRAINING PROGRAM

## 2024-06-11 PROCEDURE — 3044F HG A1C LEVEL LT 7.0%: CPT | Mod: ,,, | Performed by: STUDENT IN AN ORGANIZED HEALTH CARE EDUCATION/TRAINING PROGRAM

## 2024-06-11 PROCEDURE — 3008F BODY MASS INDEX DOCD: CPT | Mod: ,,, | Performed by: STUDENT IN AN ORGANIZED HEALTH CARE EDUCATION/TRAINING PROGRAM

## 2024-06-11 PROCEDURE — 1101F PT FALLS ASSESS-DOCD LE1/YR: CPT | Mod: ,,, | Performed by: STUDENT IN AN ORGANIZED HEALTH CARE EDUCATION/TRAINING PROGRAM

## 2024-06-11 PROCEDURE — 4010F ACE/ARB THERAPY RXD/TAKEN: CPT | Mod: ,,, | Performed by: STUDENT IN AN ORGANIZED HEALTH CARE EDUCATION/TRAINING PROGRAM

## 2024-06-11 PROCEDURE — 99212 OFFICE O/P EST SF 10 MIN: CPT | Mod: ,,, | Performed by: STUDENT IN AN ORGANIZED HEALTH CARE EDUCATION/TRAINING PROGRAM

## 2024-06-11 RX ORDER — TIZANIDINE 4 MG/1
4 TABLET ORAL DAILY PRN
COMMUNITY
Start: 2024-05-16

## 2024-06-11 RX ORDER — NITROFURANTOIN 25; 75 MG/1; MG/1
100 CAPSULE ORAL 2 TIMES DAILY
COMMUNITY
Start: 2024-06-09

## 2024-06-11 NOTE — PROGRESS NOTES
[facility]    Chief Complaint      Chief Complaint   Patient presents with    Hospital Follow Up     Went to hospital on 6/9 - [facility]. Was told to follow up with PCP.       History of Present Illness      Radha Diallo is a 63 y.o. female with chronic conditions of radiculopathy, paresthesia, vitamin d def, obesity,  who presents today for ed follow up.    Pt went to [facility] ED, bp was elevated, and she had nitrate positive urine, given Macrobid and told to follow up with pcp in a week, however, is still taking macrobid, on day 4.   Pt does report some relief in her symptoms.  BP normal today. Instructed pt to return on Friday for repeat urine only to ensure clearing of her infection.     Past Medical History:  Past Medical History:   Diagnosis Date    Arthritis     Follow-up vaginal Pap smear 07/27/2020    negative    Hyperlipidemia     Hypertension     Hypokalemia     Obesity        Past Surgical History:   has a past surgical history that includes Tubal ligation; Endometrial biopsy (03/24/2017); Oophorectomy (Bilateral, 05/17/2017); and Hysterectomy (05/17/2017).    Social History:  Social History     Tobacco Use    Smoking status: Former     Types: Cigarettes    Smokeless tobacco: Never   Substance Use Topics    Alcohol use: Never    Drug use: Never       I personally reviewed all past medical, surgical, and social.     Review of Systems   Constitutional:  Negative for fatigue and fever.   Respiratory:  Negative for shortness of breath.    Cardiovascular:  Negative for chest pain.   Gastrointestinal:  Negative for abdominal pain, nausea and vomiting.   Genitourinary:  Positive for dysuria. Negative for hematuria and urgency.        Medications:  Outpatient Encounter Medications as of 6/11/2024   Medication Sig Dispense Refill    nitrofurantoin, macrocrystal-monohydrate, (MACROBID) 100 MG capsule Take 100 mg by mouth 2 (two) times daily.      tiZANidine (ZANAFLEX) 4 MG tablet Take 4 mg  "by mouth daily as needed.      calcium carbonate-vitamin D3 (CALTRATE 600 PLUS D) 600 mg-20 mcg (800 unit) Chew Caltrate 600 plus D  600 mg-20 mcg (800 unit) chewable tablet   2 tablets po qd      HYDROcodone-acetaminophen (NORCO)  mg per tablet Take 1 tablet by mouth every 8 (eight) hours as needed for Pain.      lisinopriL-hydrochlorothiazide (PRINZIDE,ZESTORETIC) 10-12.5 mg per tablet lisinopril 10 mg-hydrochlorothiazide 12.5 mg tablet   TAKE ONE TABLET BY MOUTH ONE TIME DAILY 90 tablet 3    rosuvastatin (CRESTOR) 20 MG tablet Take 1 tablet (20 mg total) by mouth every evening. 90 tablet 3     No facility-administered encounter medications on file as of 6/11/2024.       Allergies:  Review of patient's allergies indicates:  No Known Allergies    Health Maintenance:    There is no immunization history on file for this patient.   Health Maintenance   Topic Date Due    TETANUS VACCINE  02/07/2025 (Originally 6/20/1978)    Shingles Vaccine (1 of 2) 02/07/2025 (Originally 6/20/2008)    Mammogram  12/08/2024    High Dose Statin  05/22/2025    Colorectal Cancer Screening  02/12/2027    DEXA Scan  05/24/2027    Lipid Panel  02/08/2029    Hepatitis C Screening  Completed        Physical Exam      Vital Signs  Pulse: 74  SpO2: 96 %  BP: 121/79  BP Location: Left arm  Patient Position: Sitting  Height and Weight  Height: 5' 5" (165.1 cm)  Weight: 83 kg (183 lb)  BSA (Calculated - sq m): 1.95 sq meters  BMI (Calculated): 30.5  Weight in (lb) to have BMI = 25: 149.9]    Physical Exam  Constitutional:       General: She is not in acute distress.     Appearance: Normal appearance. She is obese. She is not ill-appearing.   Cardiovascular:      Rate and Rhythm: Normal rate and regular rhythm.   Pulmonary:      Effort: Pulmonary effort is normal.      Breath sounds: Normal breath sounds.   Abdominal:      Tenderness: There is no abdominal tenderness.   Skin:     General: Skin is warm and dry.   Neurological:      General: No " focal deficit present.      Mental Status: She is alert and oriented to person, place, and time. Mental status is at baseline.          Laboratory:  CBC:  Recent Labs   Lab 06/14/23  1125   WBC 4.90   RBC 5.55 H   Hemoglobin 14.2   Hematocrit 44.9   Platelet Count 200   MCV 80.9   MCH 25.6 L   MCHC 31.6 L     CMP:  Recent Labs   Lab 06/14/23  1125 02/08/24  0822   Glucose 117 H 108 H   Calcium 9.6 9.5   Albumin 3.6  --    Total Protein 6.7  --    Sodium 141 138   Potassium 3.6 3.7   CO2 31 29   Chloride 110 H 106   BUN 12 11   Alk Phos 113  --    ALT 22  --    AST 19  --    Bilirubin, Total 0.7  --      LIPIDS:  Recent Labs   Lab 06/14/23  1125 02/08/24  0822   TSH 1.120  --    HDL Cholesterol  --  52   Cholesterol  --  184   Triglycerides  --  126   LDL Calculated  --  107   Cholesterol/HDL Ratio (Risk Factor)  --  3.5   Non-HDL  --  132     TSH:  Recent Labs   Lab 06/14/23  1125   TSH 1.120     A1C:  Recent Labs   Lab 04/12/24  1336   Hemoglobin A1C 6.2       Assessment/Plan     Radha Diallo is a 63 y.o.female with:    1. Follow-up examination    2. Urinary tract infection without hematuria, site unspecified  -     Urinalysis, Reflex to Urine Culture              Return on Friday for repeat urine        Chronic conditions status updated as per HPI.  Other than changes above, cont current medications and maintain follow up with specialists.  Return to clinic as needed.     Patient Instructions   For the first day or so, you may want to take an over-the-counter medicine, like phenazopyridine. This will help to numb your bladder. You will also not have the strong urge to urinate. This medicine causes your urine and tears to look orange. If you have kidney disease, talk to your doctor before taking this medicine.  To lower your chance of getting a UTI in the future, you can:  Drink extra fluids.  If you have sex, urinate right afterwards.  Apply a warm compress or warm water bottle to your lower belly to  lessen pain.  Practice good hygiene. Wipe from front to back after going to the toilet.  Do not use scented tampons, soap, or toilet paper.  Keep your genital area clean. Wash daily with soap and water.  Take showers instead of tub baths.  Do not use douches or genital hygiene sprays     Lilly Jennings, FNP-BC  [facility]

## 2024-06-11 NOTE — PATIENT INSTRUCTIONS
For the first day or so, you may want to take an over-the-counter medicine, like phenazopyridine. This will help to numb your bladder. You will also not have the strong urge to urinate. This medicine causes your urine and tears to look orange. If you have kidney disease, talk to your doctor before taking this medicine.  To lower your chance of getting a UTI in the future, you can:  Drink extra fluids.  If you have sex, urinate right afterwards.  Apply a warm compress or warm water bottle to your lower belly to lessen pain.  Practice good hygiene. Wipe from front to back after going to the toilet.  Do not use scented tampons, soap, or toilet paper.  Keep your genital area clean. Wash daily with soap and water.  Take showers instead of tub baths.  Do not use douches or genital hygiene sprays

## 2024-06-14 LAB
BILIRUB UR QL STRIP: NEGATIVE
CLARITY UR: ABNORMAL
COLOR UR: ABNORMAL
GLUCOSE UR STRIP-MCNC: NORMAL MG/DL
KETONES UR STRIP-SCNC: NEGATIVE MG/DL
LEUKOCYTE ESTERASE UR QL STRIP: NEGATIVE
NITRITE UR QL STRIP: NEGATIVE
PH UR STRIP: 5 PH UNITS
PROT UR QL STRIP: 10
RBC # UR STRIP: NEGATIVE /UL
SP GR UR STRIP: 1.03
UROBILINOGEN UR STRIP-ACNC: NORMAL MG/DL

## 2024-06-14 PROCEDURE — 81003 URINALYSIS AUTO W/O SCOPE: CPT | Mod: QW,,, | Performed by: CLINICAL MEDICAL LABORATORY
